# Patient Record
Sex: MALE | Race: WHITE | Employment: OTHER | URBAN - METROPOLITAN AREA
[De-identification: names, ages, dates, MRNs, and addresses within clinical notes are randomized per-mention and may not be internally consistent; named-entity substitution may affect disease eponyms.]

---

## 2017-04-13 ENCOUNTER — OFFICE VISIT (OUTPATIENT)
Dept: CARDIOLOGY CLINIC | Age: 67
End: 2017-04-13

## 2017-04-13 VITALS
OXYGEN SATURATION: 97 % | HEART RATE: 80 BPM | DIASTOLIC BLOOD PRESSURE: 64 MMHG | HEIGHT: 69 IN | WEIGHT: 241 LBS | BODY MASS INDEX: 35.7 KG/M2 | SYSTOLIC BLOOD PRESSURE: 130 MMHG

## 2017-04-13 DIAGNOSIS — I10 ESSENTIAL HYPERTENSION: Primary | ICD-10-CM

## 2017-04-13 DIAGNOSIS — R06.02 SOB (SHORTNESS OF BREATH): ICD-10-CM

## 2017-04-13 DIAGNOSIS — I45.9 CARDIAC CONDUCTION DISORDER: ICD-10-CM

## 2017-04-13 DIAGNOSIS — E78.5 HYPERLIPIDEMIA, UNSPECIFIED HYPERLIPIDEMIA TYPE: ICD-10-CM

## 2017-04-13 PROCEDURE — 99214 OFFICE O/P EST MOD 30 MIN: CPT | Performed by: INTERNAL MEDICINE

## 2017-09-18 ENCOUNTER — HOSPITAL ENCOUNTER (OUTPATIENT)
Dept: OTHER | Age: 67
Discharge: HOME | End: 2017-09-18
Attending: UROLOGY | Admitting: UROLOGY

## 2017-09-18 DIAGNOSIS — N20.0 CALCULUS OF KIDNEY: ICD-10-CM

## 2017-10-02 ENCOUNTER — HOSPITAL ENCOUNTER (OUTPATIENT)
Dept: OTHER | Age: 67
Discharge: HOME | End: 2017-10-02
Attending: UROLOGY | Admitting: UROLOGY

## 2017-10-02 DIAGNOSIS — N20.0 CALCULUS OF KIDNEY: ICD-10-CM

## 2018-07-27 LAB — PROSTATE SPECIFIC ANTIGEN: 3.21 NG/ML (ref 0–4)

## 2018-08-02 PROBLEM — Z95.0 PACEMAKER: Status: ACTIVE | Noted: 2018-08-02

## 2018-08-07 NOTE — PROGRESS NOTES
controlled  -Managed by PCP      Plan:  -Continue device interrogations as scheduled  -Discussed the importance of anticoagulation, but no afib on device thus will defer. He is aware of the risk. - On device to alert for recurrence. Would recommend starting NOAC for Afib recurrence.  -Educated on post procedure precautions    Follow up in 3 months    This note was scribed in the presence of Dr. Maurizio Diaz MD by ELIAZAR Vega.  Lauren RAZO

## 2018-08-08 ENCOUNTER — OFFICE VISIT (OUTPATIENT)
Dept: CARDIOLOGY CLINIC | Age: 68
End: 2018-08-08

## 2018-08-08 ENCOUNTER — PROCEDURE VISIT (OUTPATIENT)
Dept: CARDIOLOGY CLINIC | Age: 68
End: 2018-08-08

## 2018-08-08 VITALS
BODY MASS INDEX: 36.14 KG/M2 | OXYGEN SATURATION: 96 % | DIASTOLIC BLOOD PRESSURE: 84 MMHG | HEART RATE: 80 BPM | SYSTOLIC BLOOD PRESSURE: 138 MMHG | WEIGHT: 244 LBS | HEIGHT: 69 IN

## 2018-08-08 DIAGNOSIS — I44.2 AV BLOCK, 3RD DEGREE (HCC): Primary | ICD-10-CM

## 2018-08-08 DIAGNOSIS — Z95.0 PACEMAKER: Primary | ICD-10-CM

## 2018-08-08 DIAGNOSIS — I45.9 CARDIAC CONDUCTION DISORDER: ICD-10-CM

## 2018-08-08 DIAGNOSIS — I10 ESSENTIAL HYPERTENSION: ICD-10-CM

## 2018-08-08 DIAGNOSIS — E78.2 MIXED HYPERLIPIDEMIA: ICD-10-CM

## 2018-08-08 DIAGNOSIS — I48.0 PAROXYSMAL A-FIB (HCC): ICD-10-CM

## 2018-08-08 PROCEDURE — 99213 OFFICE O/P EST LOW 20 MIN: CPT | Performed by: INTERNAL MEDICINE

## 2018-08-08 PROCEDURE — 93280 PM DEVICE PROGR EVAL DUAL: CPT | Performed by: INTERNAL MEDICINE

## 2018-11-08 ENCOUNTER — OFFICE VISIT (OUTPATIENT)
Dept: CARDIOLOGY CLINIC | Age: 68
End: 2018-11-08

## 2018-11-08 ENCOUNTER — PROCEDURE VISIT (OUTPATIENT)
Dept: CARDIOLOGY CLINIC | Age: 68
End: 2018-11-08
Payer: MEDICARE

## 2018-11-08 VITALS
BODY MASS INDEX: 37.18 KG/M2 | DIASTOLIC BLOOD PRESSURE: 70 MMHG | OXYGEN SATURATION: 97 % | HEIGHT: 69 IN | SYSTOLIC BLOOD PRESSURE: 126 MMHG | HEART RATE: 74 BPM | WEIGHT: 251 LBS

## 2018-11-08 DIAGNOSIS — I44.30 AV BLOCK: ICD-10-CM

## 2018-11-08 DIAGNOSIS — I44.7 LBBB (LEFT BUNDLE BRANCH BLOCK): ICD-10-CM

## 2018-11-08 DIAGNOSIS — I48.0 PAROXYSMAL ATRIAL FIBRILLATION (HCC): ICD-10-CM

## 2018-11-08 DIAGNOSIS — R55 SYNCOPE, UNSPECIFIED SYNCOPE TYPE: Primary | ICD-10-CM

## 2018-11-08 DIAGNOSIS — Z95.0 PACEMAKER: ICD-10-CM

## 2018-11-08 DIAGNOSIS — I45.9 CARDIAC CONDUCTION DISORDER: ICD-10-CM

## 2018-11-08 DIAGNOSIS — I10 ESSENTIAL HYPERTENSION: ICD-10-CM

## 2018-11-08 PROCEDURE — 93280 PM DEVICE PROGR EVAL DUAL: CPT | Performed by: INTERNAL MEDICINE

## 2018-11-08 PROCEDURE — 99024 POSTOP FOLLOW-UP VISIT: CPT | Performed by: INTERNAL MEDICINE

## 2019-02-11 PROCEDURE — 93296 REM INTERROG EVL PM/IDS: CPT | Performed by: INTERNAL MEDICINE

## 2019-02-11 PROCEDURE — 93294 REM INTERROG EVL PM/LDLS PM: CPT | Performed by: INTERNAL MEDICINE

## 2019-02-12 ENCOUNTER — NURSE ONLY (OUTPATIENT)
Dept: CARDIOLOGY CLINIC | Age: 69
End: 2019-02-12
Payer: MEDICARE

## 2019-02-12 DIAGNOSIS — I45.9 CARDIAC CONDUCTION DISORDER: ICD-10-CM

## 2019-02-12 DIAGNOSIS — Z95.0 PACEMAKER: ICD-10-CM

## 2019-05-21 ENCOUNTER — NURSE ONLY (OUTPATIENT)
Dept: CARDIOLOGY CLINIC | Age: 69
End: 2019-05-21
Payer: MEDICARE

## 2019-05-21 DIAGNOSIS — I45.9 CARDIAC CONDUCTION DISORDER: ICD-10-CM

## 2019-05-21 DIAGNOSIS — Z95.0 PACEMAKER: ICD-10-CM

## 2019-05-21 PROCEDURE — 93296 REM INTERROG EVL PM/IDS: CPT | Performed by: INTERNAL MEDICINE

## 2019-05-21 PROCEDURE — 93294 REM INTERROG EVL PM/LDLS PM: CPT | Performed by: INTERNAL MEDICINE

## 2019-05-21 NOTE — PROGRESS NOTES
Remote/Carelink interrogation shows normal device function. No new episodes/arrhythmias recorded. Dr. Prasanna Jessica to review interrogation. See interrogation/Paceart report for further details.

## 2019-05-21 NOTE — LETTER
[address] [facility] [phone]    Pacemaker/Defibrillator Clinic          05/21/19        [address] [facility] [address] [facility] [address]        Dear Deloris Romero    This letter is to inform you that we received the transmission from your monitor at home that checks your pacemaker and/or defibrillator, or implanted heart monitor. The next date you should transmit will be 8/27/19. Please be aware that the remote device transmission sites are periodically monitored only during regular business hours during which simultaneous in-office device clinics are being run. If your transmission requires attention, we will contact you as soon as possible. Thank you.             [facility]

## 2019-08-27 ENCOUNTER — NURSE ONLY (OUTPATIENT)
Dept: CARDIOLOGY CLINIC | Age: 69
End: 2019-08-27
Payer: MEDICARE

## 2019-08-27 DIAGNOSIS — Z95.0 PACEMAKER: ICD-10-CM

## 2019-08-27 DIAGNOSIS — I44.30 INFRA-HIS ATRIOVENTRICULAR BLOCK: ICD-10-CM

## 2019-08-27 DIAGNOSIS — I45.9 CARDIAC CONDUCTION DISORDER: ICD-10-CM

## 2019-08-27 PROCEDURE — 93294 REM INTERROG EVL PM/LDLS PM: CPT | Performed by: INTERNAL MEDICINE

## 2019-08-27 PROCEDURE — 93296 REM INTERROG EVL PM/IDS: CPT | Performed by: INTERNAL MEDICINE

## 2019-08-30 PROBLEM — I44.30: Status: ACTIVE | Noted: 2019-08-30

## 2019-10-09 ENCOUNTER — PROCEDURE VISIT (OUTPATIENT)
Dept: CARDIOLOGY CLINIC | Age: 69
End: 2019-10-09

## 2019-10-09 DIAGNOSIS — Z95.0 PACEMAKER: ICD-10-CM

## 2019-10-09 DIAGNOSIS — I45.9 CARDIAC CONDUCTION DISORDER: ICD-10-CM

## 2019-10-09 DIAGNOSIS — I44.30 INFRA-HIS ATRIOVENTRICULAR BLOCK: ICD-10-CM

## 2019-11-26 ENCOUNTER — NURSE ONLY (OUTPATIENT)
Dept: CARDIOLOGY CLINIC | Age: 69
End: 2019-11-26
Payer: MEDICARE

## 2019-11-26 ENCOUNTER — OFFICE VISIT (OUTPATIENT)
Dept: CARDIOLOGY CLINIC | Age: 69
End: 2019-11-26
Payer: MEDICARE

## 2019-11-26 VITALS
SYSTOLIC BLOOD PRESSURE: 138 MMHG | HEART RATE: 75 BPM | BODY MASS INDEX: 34.07 KG/M2 | WEIGHT: 230 LBS | DIASTOLIC BLOOD PRESSURE: 70 MMHG | OXYGEN SATURATION: 96 % | HEIGHT: 69 IN

## 2019-11-26 DIAGNOSIS — I48.0 PAROXYSMAL ATRIAL FIBRILLATION (HCC): Primary | ICD-10-CM

## 2019-11-26 DIAGNOSIS — I44.30 INFRA-HIS ATRIOVENTRICULAR BLOCK: ICD-10-CM

## 2019-11-26 DIAGNOSIS — I44.1 AV BLOCK, 2ND DEGREE: ICD-10-CM

## 2019-11-26 DIAGNOSIS — Z95.0 PACEMAKER: ICD-10-CM

## 2019-11-26 DIAGNOSIS — I45.9 CARDIAC CONDUCTION DISORDER: ICD-10-CM

## 2019-11-26 PROCEDURE — 93000 ELECTROCARDIOGRAM COMPLETE: CPT | Performed by: INTERNAL MEDICINE

## 2019-11-26 PROCEDURE — 93280 PM DEVICE PROGR EVAL DUAL: CPT | Performed by: INTERNAL MEDICINE

## 2019-11-26 PROCEDURE — 99214 OFFICE O/P EST MOD 30 MIN: CPT | Performed by: INTERNAL MEDICINE

## 2020-02-14 ENCOUNTER — HOSPITAL ENCOUNTER (OUTPATIENT)
Dept: GENERAL RADIOLOGY | Age: 70
Discharge: HOME | End: 2020-02-14
Payer: MEDICARE

## 2020-02-14 ENCOUNTER — HOSPITAL ENCOUNTER (OUTPATIENT)
Age: 70
Discharge: HOME | End: 2020-02-14
Payer: MEDICARE

## 2020-02-14 PROCEDURE — 71046 X-RAY EXAM CHEST 2 VIEWS: CPT

## 2020-03-03 ENCOUNTER — NURSE ONLY (OUTPATIENT)
Dept: CARDIOLOGY CLINIC | Age: 70
End: 2020-03-03
Payer: MEDICARE

## 2020-03-03 NOTE — PROGRESS NOTES
Carelink transmission shows normal sensing and pacing function. Noted NSVT. Pt is on Toprol. Patient has known AF but wishes not to be on anti coags. . See interrogation for more details.

## 2020-03-03 NOTE — LETTER
[address] [facility] [phone]  [place] [facility] [phone]    Pacemaker/Defibrillator Clinic          03/03/20        [address] [facility] [address] [place] [address]        Dear Domi Gregory    This letter is to inform you that we received the transmission from your monitor at home that checks your implanted heart device. The next date your monitor will automatically transmit will be 6-3-20. If your report needs attention we will notify you. Your device and monitor are wireless and most transmit cellularly, but please periodically check your monitor is still plugged in to the electrical outlet. If you still use the telephone land line to send please ensure the connection to the phone harshil is secure. This will help to ensure successful automatic transmissions in the future. Also, the monitor needs to be close to you while sleeping at night. Please be aware that the remote device transmission sites are periodically monitored only during regular business hours during which simultaneous in-office device clinics are being run. If your transmission requires attention, we will contact you as soon as possible. Thank you.             [facility]

## 2020-03-04 PROCEDURE — 93296 REM INTERROG EVL PM/IDS: CPT | Performed by: INTERNAL MEDICINE

## 2020-03-04 PROCEDURE — 93294 REM INTERROG EVL PM/LDLS PM: CPT | Performed by: INTERNAL MEDICINE

## 2020-06-03 ENCOUNTER — NURSE ONLY (OUTPATIENT)
Dept: CARDIOLOGY CLINIC | Age: 70
End: 2020-06-03
Payer: MEDICARE

## 2020-06-03 PROCEDURE — 93294 REM INTERROG EVL PM/LDLS PM: CPT | Performed by: INTERNAL MEDICINE

## 2020-06-03 PROCEDURE — 93296 REM INTERROG EVL PM/IDS: CPT | Performed by: INTERNAL MEDICINE

## 2020-09-08 ENCOUNTER — NURSE ONLY (OUTPATIENT)
Dept: CARDIOLOGY CLINIC | Age: 70
End: 2020-09-08
Payer: MEDICARE

## 2020-09-08 PROCEDURE — 93294 REM INTERROG EVL PM/LDLS PM: CPT | Performed by: INTERNAL MEDICINE

## 2020-09-08 PROCEDURE — 93296 REM INTERROG EVL PM/IDS: CPT | Performed by: INTERNAL MEDICINE

## 2020-09-08 NOTE — LETTER
[address] [facility] [phone]  [address] [phone]    Pacemaker/Defibrillator Clinic          09/09/20        91 [facility] [address] [facility] [address]        Dear Fariha Almazan    This letter is to inform you that we received the transmission from your monitor at home that checks your implanted heart device. The next date your monitor will automatically transmit will be 3-8-21. If your report needs attention we will notify you. Your device and monitor are wireless and most transmit cellularly, but please periodically check your monitor is still plugged in to the electrical outlet. If you still use the telephone land line to send please ensure the connection to the phone harshli is secure. This will help to ensure successful automatic transmissions in the future. Also, the monitor needs to be close to you while sleeping at night. Please be aware that the remote device transmission sites are periodically monitored only during regular business hours during which simultaneous in-office device clinics are being run. If your transmission requires attention, we will contact you as soon as possible. Thank you.             [address]ancy [address]

## 2020-09-09 NOTE — PROGRESS NOTES
We received remote transmission from patient's monitor at home. Transmission shows normal sensing and pacing function. EP physician will review. See interrogation under cardiology tab in the [address] [facility] [address] field for more details.

## 2020-12-01 ENCOUNTER — NURSE ONLY (OUTPATIENT)
Dept: CARDIOLOGY CLINIC | Age: 70
End: 2020-12-01
Payer: MEDICARE

## 2020-12-01 ENCOUNTER — OFFICE VISIT (OUTPATIENT)
Dept: CARDIOLOGY CLINIC | Age: 70
End: 2020-12-01
Payer: MEDICARE

## 2020-12-01 VITALS
SYSTOLIC BLOOD PRESSURE: 144 MMHG | WEIGHT: 240 LBS | HEIGHT: 69 IN | BODY MASS INDEX: 35.55 KG/M2 | DIASTOLIC BLOOD PRESSURE: 78 MMHG | OXYGEN SATURATION: 96 % | TEMPERATURE: 97.5 F | HEART RATE: 78 BPM

## 2020-12-01 PROCEDURE — 99214 OFFICE O/P EST MOD 30 MIN: CPT | Performed by: INTERNAL MEDICINE

## 2020-12-01 PROCEDURE — 93280 PM DEVICE PROGR EVAL DUAL: CPT | Performed by: INTERNAL MEDICINE

## 2020-12-01 PROCEDURE — 93000 ELECTROCARDIOGRAM COMPLETE: CPT | Performed by: INTERNAL MEDICINE

## 2020-12-01 NOTE — PROGRESS NOTES
Pt seen in clinic today for cardiac device interrogation. Their device is a MDT 2 chamber ppm.  Based on threshold, impedance, and intrinsic sensing tests run today, the device appears to be functioning normally. Remaining battery life is 10y6m  AP 28.74%                  77.94%      Pt with one episode AT/AF since last remote check - 21-Sep-2020 12:34am [id number]    Rx: No AC   metoprolol succinate (TOPROL XL) 25 MG extended release tablet Take 1 tablet by mouth daily     Pt was informed of findings today and general questions have been answered with regard to device. Home monitoring hardware is transmitting on schedule. Results discussed with or to be reviewed by Dr. Luana Acosta    Pt to see Dr. Luana Acosta in clinic today following their device check.

## 2020-12-01 NOTE — PROGRESS NOTES
Negative for dizziness, syncope and light-headedness. Hematological: Negative. Psychiatric/Behavioral: Negative for behavioral problems and agitation. BP (!) 144/78 Comment: recheck  Pulse 78   Temp 97.5 °F (36.4 °C)   Ht 5' 9\" (1.753 m)   Wt 240 lb (108.9 kg) Comment: with shoes  SpO2 96%   BMI 35.44 kg/m²     Objective:  Physical Exam   Nursing note and vitals reviewed. Constitutional: He appears well-developed and well-nourished. Head:  atraumatic. Eyes: Right eye exhibits no discharge. Left eye exhibits no discharge. Neck: Neck supple. Cardiovascular: Normal rate, regular rhythm and normal heart sounds. Pulmonary/Chest: Effort normal and breath sounds normal.   Abdominal: Soft. Musculoskeletal: He exhibits no edema. Neurological: He is alert without any gross abnormalities. Skin: Skin is warm and dry. +LCW incision healed   Psychiatric: He has a normal mood and affect. 24 hour holter: 7/2015  NSR with av block    Echo: 7/31/18  Suboptimal image quality. Normal left ventricle size and systolic function with an estimated ejection  fraction of 55-60%. No regional wall motion abnormalities are seen. There is  mildly increased left ventricular wall thickness. Diastolic filling  parameters suggest grade I diastolic dysfunction. Normal right ventricular size and function. Trivial mitral and tricuspid regurgitation. Stress Test: 12/2012  Normal myocardial perfusion scan with no evidence of myocardial ischemia or infarct. Assessment:    AV block with syncope:  -Consistent with paroxymal AV block with LBBB. -Underwent EPS with subsequent PPM implant  -Device interrogated today and functioning appropriately  -AP 23.5%,  1.2%     Paroxymal atrial fibrillation  EKG today shows sinus and ventricular pacing  On Toprol XL     He has a [id number] Score 2 (HTN, AGE). ~ 2 % stroke risk per year.   I discussed oral anticoagulation to decrease the risk of thromboembolic events including stroke. Benefits and alternatives were discussed with patient. Risk of bleeding was discussed. Patient verbalized understanding he currently declined to start on [address] at this time and will call the office if he decides to start in the future. Device interrogation today and based on threshold, impedance, and intrinsic sensing tests run today, the device appears to be functioning normally. Remaining battery life is 10 year 6 months  AP 28.74%    77.94%  Device interrogation today revealed one episode AT/AF since last interrogation. Atrial fibrillation risk factors including age, HTN, obesity, inactivity and CHHAYA were discussed with patient. Risk factor modification recommended. All questions were answered. Discussed that sleep apnea could contribute. HTN:  Controlled. Managed by PCP    Suspected sleep apnea  Referral placed to sleep medicine for evaluation. Plan:  1) Continue remote monitoring of device. 2) We again dicussed the importance of anticoagulation and he declined to start at this time and will contact the office if he wishes to start in the future. Referral to sleep medicine for evaluation of suspected sleep apnea. 3) Follow up in one year. Elana Peralta. Lauren RAZO Cardiac Electrophysiology  [address] [facility] [address]  [address] [facility], [address] [place] [address]  [phone]        This note was scribed in the presence of Yehuda Baldwin MD by Robby Charles RN. The scribes documentation has been prepared under my direction and personally reviewed by me in its entirety. I confirm that the note above accurately reflects all work, treatment, procedures, and medical decision making performed by me.

## 2021-03-07 PROCEDURE — 93296 REM INTERROG EVL PM/IDS: CPT | Performed by: INTERNAL MEDICINE

## 2021-03-07 PROCEDURE — 93294 REM INTERROG EVL PM/LDLS PM: CPT | Performed by: INTERNAL MEDICINE

## 2021-03-08 ENCOUNTER — NURSE ONLY (OUTPATIENT)
Dept: CARDIOLOGY CLINIC | Age: 71
End: 2021-03-08
Payer: MEDICARE

## 2021-03-08 DIAGNOSIS — I48.0 PAROXYSMAL ATRIAL FIBRILLATION (HCC): ICD-10-CM

## 2021-03-08 DIAGNOSIS — Z95.0 PACEMAKER: ICD-10-CM

## 2021-03-08 DIAGNOSIS — I45.9 CARDIAC CONDUCTION DISORDER: ICD-10-CM

## 2021-03-08 NOTE — LETTER
17[address] [facility] [phone]  [address] [phone]    Pacemaker/Defibrillator Clinic          03/08/21        [address] [facility] [address] [facility] [address]        Dear Hoa Harris    This letter is to inform you that we received the transmission from your monitor at home that checks your implanted heart device. The next date your monitor will automatically transmit will be 6-14-21. If your report needs attention we will notify you. Your device and monitor are wireless and most transmit cellularly, but please periodically check your monitor is still plugged in to the electrical outlet. If you still use the telephone land line to send please ensure the connection to the phone harshil is secure. This will help to ensure successful automatic transmissions in the future. Also, the monitor needs to be close to you while sleeping at night. Please be aware that the remote device transmission sites are periodically monitored only during regular business hours during which simultaneous in-office device clinics are being run. If your transmission requires attention, we will contact you as soon as possible. Thank you.             [facility]

## 2021-03-08 NOTE — PROGRESS NOTES
We received remote transmission from patient's monitor at home. Transmission shows normal sensing and pacing function. Pt has known AF. Pt doesn't want to be anti coagulated. EP physician will review. See interrogation under cardiology tab in the [address] [place] [address] [address] field for more details.

## 2021-05-21 LAB — PROSTATE SPECIFIC ANTIGEN: 2.45 NG/ML (ref 0–4)

## 2021-06-14 ENCOUNTER — NURSE ONLY (OUTPATIENT)
Dept: CARDIOLOGY CLINIC | Age: 71
End: 2021-06-14
Payer: MEDICARE

## 2021-06-14 DIAGNOSIS — I44.30 INFRA-HIS ATRIOVENTRICULAR BLOCK: ICD-10-CM

## 2021-06-14 DIAGNOSIS — I45.9 CARDIAC CONDUCTION DISORDER: ICD-10-CM

## 2021-06-14 DIAGNOSIS — Z95.0 PACEMAKER: ICD-10-CM

## 2021-06-14 NOTE — LETTER
[address] [facility] [phone]  [address] [phone]    Pacemaker/Defibrillator Clinic    06/16/21      [address] [place] [address] [address] [place] [address]      Dear Sarahy Durham    This letter is to inform you that we received the transmission from your monitor at home that checks your implanted heart device. The next date your monitor will automatically transmit will be 9/14. If your report needs attention we will notify you. Your device and monitor are wireless and most transmit cellularly, but please periodically check your monitor is still plugged in to the electrical outlet. If you still use the telephone land line to send please ensure the connection to the phone harshil is secure. This will help to ensure successful automatic transmissions in the future. Also, the monitor needs to be close to you while sleeping at night. Please be aware that the remote device transmission sites are periodically monitored only during regular business hours during which simultaneous in-office device clinics are being run. If your transmission requires attention, we will contact you as soon as possible. **PLEASE NOTE** that our [facility] policy and processes are changing to ensure a more seamless approach for all parties involved, allowing more time for our nurses to address patient issues and concerns. We will no longer be sending letters for NORMAL remote transmissions. You will be contacted by phone if your transmission requires attention (as previously done), and letters will only be sent regarding monitor disconnections or missed transmissions if you are unable to be reached by phone. Please do not be alarmed by this new process, as we will continue to contact you if your transmission report requires attention. This will be your final \"remote received\" letter.   From this point forward, the [facility] will be

## 2021-06-15 NOTE — PROGRESS NOTES
We received remote transmission from patient's monitor at home. Transmission shows normal sensing and pacing function. Pt has known AF. Pt doesn't want to be anti coagulated. Longest AT/AF event ~10hrs, 1.3% burden. EP physician will review. See interrogation under cardiology tab in the [address] [place] [address] [address] field for more details. Follow up in 3 months via [employer].

## 2021-06-16 PROCEDURE — 93294 REM INTERROG EVL PM/LDLS PM: CPT | Performed by: INTERNAL MEDICINE

## 2021-06-16 PROCEDURE — 93296 REM INTERROG EVL PM/IDS: CPT | Performed by: INTERNAL MEDICINE

## 2021-06-16 NOTE — RESULT ENCOUNTER NOTE
Device interrogation reviewed. Many episodes of atrial fibrillation but not on [address]. Please have patient see me in clinic to discuss these findings, the need for DOAC and the treatment options for atrial fibrillation including ablation.

## 2021-07-27 NOTE — PROGRESS NOTES
[facility]   Cardiac Consultation    Referring Provider:  WILL Huff CNP     Chief Complaint   Patient presents with    Atrial Fibrillation     afib - device check - called in by staff       HPI:  Yusuf Aldana is a 66 y.o. male with a PMH significant for HTN, HLD and PAfib that presented to [facility] 7/21/18 with his fourth episode of of abrupt syncope. He has had LBBB back to 2013 with prior EKG's in 2000 and 3/2008 stress not mentioning LBBB. He described the four episodes as being in various positions including kneeling. This one was a sudden LOC after < 1 sec of dizziness and feeling hot. He has no hx of chest pain or sob. Underwent EPS revealing infra-his block with subsequent PPM implant on 8/2/18. He had a couple hours of afib 10/9/20. We reviewed paf time he was here and he declined [address] [employer]. She also never followed up on sleep apnea. Interval History: Today, he presents to office for follow up for atrial fibrillation as colleague arranged for him to be seen. Jairo Warren He reports that he did not complete his sleep study. He reports shortness of breath which he reports is stable since last visit and before. . Patient denies exertional chest pain/pressure, dyspnea at rest, PND, orthopnea, palpitations, lightheadedness, weight changes, changes in LE edema, and syncope. Past Medical History:   has a past medical history of Atrial fibrillation ([address] Colon polyp, Enlarged prostate, FHx: hyperlipidemia, History of echocardiogram, and Hypertension. Surgical History:   has a past surgical history that includes Cardiac catheterization; Colonoscopy; TURP (06/29/2016); and Prostate biopsy (2016). Social History:   reports that he has never smoked. He has never used smokeless tobacco. He reports current alcohol use. He reports that he does not use drugs.      Family History:  family history includes Cancer in his father and mother; Diabetes in his father; High Cholesterol in his father and mother. Home Medications:  Outpatient Encounter Medications as of 7/29/2021   Medication Sig Dispense Refill    metoprolol succinate (TOPROL XL) 25 MG extended release tablet Take 1 tablet by mouth daily 30 tablet 3    ondansetron (ZOFRAN ODT) 4 MG disintegrating tablet Take 1-2 tablets by mouth every 12 hours as needed for Nausea 12 tablet 0    lisinopril (PRINIVIL;ZESTRIL) 40 MG tablet TAKE ONE TABLET BY MOUTH DAILY 90 tablet 2    PROAIR  (90 BASE) MCG/ACT inhaler INHALE TWO PUFFS BY MOUTH FOUR TIMES A DAY AS NEEDED 1 Inhaler 10    albuterol sulfate HFA (PROAIR HFA) 108 (90 BASE) MCG/ACT inhaler Inhale 2 puffs into the lungs every 6 hours as needed for Wheezing 1 Inhaler 3    doxazosin (CARDURA) 8 MG tablet Take 1 tablet by mouth nightly 90 tablet 3    budesonide-formoterol (SYMBICORT) 160-4.5 MCG/ACT AERO Inhale 2 puffs into the lungs 2 times daily 1 Inhaler 3    fluticasone (FLONASE) 50 MCG/ACT nasal spray PLACE 2 SPRAYS IN EACH NOSTRIL DAILY 1 Bottle 10    aspirin 325 MG tablet Take 325 mg by mouth daily      cyanocobalamin 1000 MCG/ML injection Inject 1 mL into the muscle once for 1 dose 1 mL 0     Facility-Administered Encounter Medications as of 7/29/2021   Medication Dose Route Frequency Provider Last Rate Last Admin    cyanocobalamin injection 1,000 mcg  1,000 mcg Intramuscular Once Iona Johnston, APRN - CNP           Allergies:  Codeine and Hydrocodone     Review of Systems   Constitutional: Negative for activity change and appetite change. HENT: Negative for facial swelling and neck pain. Eyes: Negative for discharge and itching. Respiratory: chronic shortness of breath. Cardiovascular: Negative for palpitations. Negative for chest pain. Negative for leg swelling. Gastrointestinal: Negative for abdominal pain and abdominal distention. Genitourinary: Negative. Musculoskeletal: Negative. Skin: Negative for color change and pallor.     Neurological: Negative for dizziness, syncope and light-headedness. Hematological: Negative. Psychiatric/Behavioral: Negative for behavioral problems and agitation. BP (!) 148/80 (Site: Left Upper Arm, Position: Sitting)   Pulse 73   Ht 5' 9\" (1.753 m)   Wt 233 lb (105.7 kg)   SpO2 95%   BMI 34.41 kg/m²     Objective:  Physical Exam   Nursing note and vitals reviewed. Constitutional: He appears well-developed and well-nourished. Head:  atraumatic. Eyes: Right eye exhibits no discharge. Left eye exhibits no discharge. Neck: Neck supple. Cardiovascular: Normal rate, regular rhythm and normal heart sounds. Pulmonary/Chest: Effort normal and breath sounds normal.   Abdominal: Soft. Musculoskeletal: He exhibits no edema. Neurological: He is alert without any gross abnormalities. Skin: Skin is warm and dry. +LCW incision healed   Psychiatric: He has a normal mood and affect. 24 hour holter: 7/2015  NSR with av block    Echo: 7/31/18  Suboptimal image quality. Normal left ventricle size and systolic function with an estimated ejection  fraction of 55-60%. No regional wall motion abnormalities are seen. There is  mildly increased left ventricular wall thickness. Diastolic filling  parameters suggest grade I diastolic dysfunction. Normal right ventricular size and function. Trivial mitral and tricuspid regurgitation. Stress Test: 12/2012  Normal myocardial perfusion scan with no evidence of myocardial ischemia or infarct. Assessment:    AV block with syncope:  -Consistent with paroxymal AV block with LBBB. -Underwent EPS with subsequent PPM implant  -Device interrogated today and functioning appropriately  -AP 23.5%,  1.2%     Paroxymal atrial fibrillation  EKG today shows sinus and ventricular pacing  On Toprol XL     He has a [id number] Score 2 (HTN, AGE)  ~ 2 % stroke risk per year. Not currently on [address].   I discussed oral anticoagulation to decrease the risk of thromboembolic events including stroke. Benefits and alternatives were discussed with patient. Risk of bleeding was discussed. Patient verbalized understanding. Different forms of anticoagulants including warfarin (Coumadin), Dabigatran (Pradaxa), Rivaroxaban (Xarelto), Apixaban (Eliquis), and Edoxaban [facility]) were discussed. Patient again declines [address]. Atrial fibrillation risk factors including age, HTN, obesity, inactivity and CHHAYA were discussed with patient. Risk factor modification recommended. All questions were answered. Device interrogated today and based on threshold, impedance and intrinsic sensing test run today the device appear to be functioning with establish parameters. Remaining battery life is 9 year 5 months  AP 28.9%    37.6%    HTN:  Controlled. Managed by PCP    Suspected sleep apnea  Referral placed to sleep medicine for evaluation before. Patient did not complete sleep study. Encouraged to complete. Plan:  1) Continue remote monitoring of device. 2) We again dicussed the importance of anticoagulation and he declined at this time and will contact the office if he wishes to start in the future. 3) Follow up in one year. This note was scribed in the presence of Vilma Schilling MD by Cresencio Damico RN. The scribes documentation has been prepared under my direction and personally reviewed by me in its entirety. I confirm that the note above accurately reflects all work, treatment, procedures, and medical decision making performed by me. Nadine Williamson. Lauren FROST.    Cardiac Electrophysiology  [address], [address] [place] [address]  [phone]

## 2021-07-29 ENCOUNTER — OFFICE VISIT (OUTPATIENT)
Dept: CARDIOLOGY CLINIC | Age: 71
End: 2021-07-29
Payer: MEDICARE

## 2021-07-29 ENCOUNTER — NURSE ONLY (OUTPATIENT)
Dept: CARDIOLOGY CLINIC | Age: 71
End: 2021-07-29
Payer: MEDICARE

## 2021-07-29 VITALS
HEIGHT: 69 IN | HEART RATE: 73 BPM | WEIGHT: 233 LBS | OXYGEN SATURATION: 95 % | DIASTOLIC BLOOD PRESSURE: 80 MMHG | BODY MASS INDEX: 34.51 KG/M2 | SYSTOLIC BLOOD PRESSURE: 148 MMHG

## 2021-07-29 DIAGNOSIS — I48.0 PAROXYSMAL ATRIAL FIBRILLATION (HCC): ICD-10-CM

## 2021-07-29 DIAGNOSIS — I44.30 INFRA-HIS ATRIOVENTRICULAR BLOCK: ICD-10-CM

## 2021-07-29 DIAGNOSIS — Z95.0 PACEMAKER: ICD-10-CM

## 2021-07-29 DIAGNOSIS — I45.9 CARDIAC CONDUCTION DISORDER: ICD-10-CM

## 2021-07-29 DIAGNOSIS — I44.7 LBBB (LEFT BUNDLE BRANCH BLOCK): ICD-10-CM

## 2021-07-29 DIAGNOSIS — I42.9 CARDIOMYOPATHY, UNSPECIFIED TYPE (HCC): ICD-10-CM

## 2021-07-29 DIAGNOSIS — I48.0 PAROXYSMAL ATRIAL FIBRILLATION (HCC): Primary | ICD-10-CM

## 2021-07-29 PROCEDURE — 93280 PM DEVICE PROGR EVAL DUAL: CPT | Performed by: INTERNAL MEDICINE

## 2021-07-29 PROCEDURE — 99213 OFFICE O/P EST LOW 20 MIN: CPT | Performed by: INTERNAL MEDICINE

## 2021-09-14 ENCOUNTER — NURSE ONLY (OUTPATIENT)
Dept: CARDIOLOGY CLINIC | Age: 71
End: 2021-09-14

## 2021-09-14 DIAGNOSIS — I48.0 PAROXYSMAL ATRIAL FIBRILLATION (HCC): ICD-10-CM

## 2021-09-14 DIAGNOSIS — Z95.0 PACEMAKER: ICD-10-CM

## 2021-09-14 DIAGNOSIS — I44.30 INFRA-HIS ATRIOVENTRICULAR BLOCK: ICD-10-CM

## 2021-09-14 PROCEDURE — 93294 REM INTERROG EVL PM/LDLS PM: CPT | Performed by: INTERNAL MEDICINE

## 2021-09-14 PROCEDURE — 93296 REM INTERROG EVL PM/IDS: CPT | Performed by: INTERNAL MEDICINE

## 2021-09-15 NOTE — PROGRESS NOTES
We received remote transmission from patient's dual chamber pacemaker monitor at home. Transmission shows normal sensing and pacing function. Pt has known AF, 0.5% burden (ASA 325mg and Toprol XL); pt declines OAC. Ap 28.4%   100% (MVP On)  Echo 07.2018 showed EF of 55-60%. EP physician will review. See interrogation under cardiology tab in the [address] [place] [address] [address] field for more details.

## 2021-12-14 ENCOUNTER — NURSE ONLY (OUTPATIENT)
Dept: CARDIOLOGY CLINIC | Age: 71
End: 2021-12-14
Payer: MEDICARE

## 2021-12-14 DIAGNOSIS — Z95.0 PACEMAKER: ICD-10-CM

## 2021-12-14 DIAGNOSIS — I44.30 INFRA-HIS ATRIOVENTRICULAR BLOCK: ICD-10-CM

## 2021-12-15 NOTE — PROGRESS NOTES
We received remote transmission from patient's dual chamber pacemaker monitor at home. Transmission shows normal sensing and pacing function. Pt has known AF, 1.3% burden (ASA 325mg and Toprol XL); pt declines OAC. Ap 33.6%   100% (MVP On)  Echo 07.2018 showed EF of 55-60%. EP physician will review. See interrogation under cardiology tab in the [address] [place] [address] [address] field for more details. Will continue to monitor remotely.

## 2021-12-18 PROCEDURE — 93296 REM INTERROG EVL PM/IDS: CPT | Performed by: INTERNAL MEDICINE

## 2021-12-18 PROCEDURE — 93294 REM INTERROG EVL PM/LDLS PM: CPT | Performed by: INTERNAL MEDICINE

## 2022-03-15 ENCOUNTER — NURSE ONLY (OUTPATIENT)
Dept: CARDIOLOGY CLINIC | Age: 72
End: 2022-03-15
Payer: MEDICARE

## 2022-03-15 DIAGNOSIS — I44.7 LBBB (LEFT BUNDLE BRANCH BLOCK): ICD-10-CM

## 2022-03-15 DIAGNOSIS — I44.30 INFRA-HIS ATRIOVENTRICULAR BLOCK: ICD-10-CM

## 2022-03-15 DIAGNOSIS — I45.9 CARDIAC CONDUCTION DISORDER: ICD-10-CM

## 2022-03-15 DIAGNOSIS — Z95.0 PACEMAKER: ICD-10-CM

## 2022-03-15 NOTE — PROGRESS NOTES
We received remote transmission from patient's dual chamber pacemaker monitor at home. Transmission shows normal sensing and pacing function. Pt has known AF, 6.4% burden (ASA 325mg and Toprol XL; pt declines [address]). Ap 21.6%   98.7% (MVP On)  Echo 07.2018 showed EF of 55-60%. EP physician will review. See interrogation under cardiology tab in the [address] [facility] [address] field for more details. Will continue to monitor remotely.

## 2022-03-19 PROCEDURE — 93294 REM INTERROG EVL PM/LDLS PM: CPT | Performed by: INTERNAL MEDICINE

## 2022-03-19 PROCEDURE — 93296 REM INTERROG EVL PM/IDS: CPT | Performed by: INTERNAL MEDICINE

## 2022-06-20 ENCOUNTER — NURSE ONLY (OUTPATIENT)
Dept: CARDIOLOGY CLINIC | Age: 72
End: 2022-06-20
Payer: MEDICARE

## 2022-06-20 DIAGNOSIS — Z95.0 PACEMAKER: ICD-10-CM

## 2022-06-20 DIAGNOSIS — I44.30 INFRA-HIS ATRIOVENTRICULAR BLOCK: ICD-10-CM

## 2022-06-20 PROCEDURE — 93294 REM INTERROG EVL PM/LDLS PM: CPT | Performed by: INTERNAL MEDICINE

## 2022-06-20 PROCEDURE — 93296 REM INTERROG EVL PM/IDS: CPT | Performed by: INTERNAL MEDICINE

## 2022-06-23 NOTE — PROGRESS NOTES
We received remote transmission from patient's dual chamber pacemaker monitor at home. Transmission shows normal sensing and pacing function. Pt has known AF, 4.5% burden (ASA 325mg and Toprol XL; pt declines [facility]). Ap 29.3%   99.9% (MVP On)  Echo 07.2018 showed EF of 55-60%. EP physician will review. See interrogation under cardiology tab in the [address] [place] [address] [address] field for more details. Will continue to monitor remotely.      (End of 91-day monitoring period 6/20/22)

## 2022-07-22 ENCOUNTER — HOSPITAL ENCOUNTER (INPATIENT)
Age: 72
LOS: 2 days | Discharge: HOME | DRG: 378 | End: 2022-07-24
Attending: SPECIALIST | Admitting: SPECIALIST
Payer: MEDICARE

## 2022-07-22 ENCOUNTER — APPOINTMENT (OUTPATIENT)
Dept: GENERAL RADIOLOGY | Age: 72
DRG: 378 | End: 2022-07-22
Payer: MEDICARE

## 2022-07-22 DIAGNOSIS — D64.9 ANEMIA, UNSPECIFIED TYPE: ICD-10-CM

## 2022-07-22 DIAGNOSIS — K92.2 UPPER GASTROINTESTINAL BLEED: Primary | ICD-10-CM

## 2022-07-22 PROBLEM — T39.395A GI BLEED DUE TO NSAIDS: Status: ACTIVE | Noted: 2022-07-22

## 2022-07-22 LAB
ABO/RH: NORMAL
ANION GAP SERPL CALCULATED.3IONS-SCNC: 9 MMOL/L (ref 3–16)
ANTIBODY SCREEN: NORMAL
BASOPHILS ABSOLUTE: 0 K/UL (ref 0–0.2)
BASOPHILS RELATIVE PERCENT: 0.2 %
BILIRUBIN URINE: NEGATIVE
BLOOD, URINE: NEGATIVE
BUN BLDV-MCNC: 55 MG/DL (ref 7–20)
CALCIUM SERPL-MCNC: 9.5 MG/DL (ref 8.3–10.6)
CHLORIDE BLD-SCNC: 106 MMOL/L (ref 99–110)
CLARITY: CLEAR
CO2: 26 MMOL/L (ref 21–32)
COLOR: YELLOW
CREAT SERPL-MCNC: 1 MG/DL (ref 0.8–1.3)
EOSINOPHILS ABSOLUTE: 0 K/UL (ref 0–0.6)
EOSINOPHILS RELATIVE PERCENT: 0.5 %
GFR AFRICAN AMERICAN: >60
GFR NON-AFRICAN AMERICAN: >60
GLUCOSE BLD-MCNC: 105 MG/DL (ref 70–99)
GLUCOSE URINE: NEGATIVE MG/DL
HCT VFR BLD CALC: 30.1 % (ref 40.5–52.5)
HEMOGLOBIN: 10.3 G/DL (ref 13.5–17.5)
KETONES, URINE: ABNORMAL MG/DL
LEUKOCYTE ESTERASE, URINE: NEGATIVE
LYMPHOCYTES ABSOLUTE: 0.9 K/UL (ref 1–5.1)
LYMPHOCYTES RELATIVE PERCENT: 13.9 %
MCH RBC QN AUTO: 29.8 PG (ref 26–34)
MCHC RBC AUTO-ENTMCNC: 34.1 G/DL (ref 31–36)
MCV RBC AUTO: 87.5 FL (ref 80–100)
MICROSCOPIC EXAMINATION: ABNORMAL
MONOCYTES ABSOLUTE: 0.4 K/UL (ref 0–1.3)
MONOCYTES RELATIVE PERCENT: 6.2 %
NEUTROPHILS ABSOLUTE: 5.3 K/UL (ref 1.7–7.7)
NEUTROPHILS RELATIVE PERCENT: 79.2 %
NITRITE, URINE: NEGATIVE
OCCULT BLOOD DIAGNOSTIC: ABNORMAL
PDW BLD-RTO: 13.7 % (ref 12.4–15.4)
PH UA: 5 (ref 5–8)
PLATELET # BLD: 155 K/UL (ref 135–450)
PMV BLD AUTO: 8.9 FL (ref 5–10.5)
POTASSIUM REFLEX MAGNESIUM: 3.8 MMOL/L (ref 3.5–5.1)
PROTEIN UA: NEGATIVE MG/DL
RBC # BLD: 3.45 M/UL (ref 4.2–5.9)
SODIUM BLD-SCNC: 141 MMOL/L (ref 136–145)
SPECIFIC GRAVITY UA: 1.02 (ref 1–1.03)
URINE REFLEX TO CULTURE: ABNORMAL
URINE TYPE: ABNORMAL
UROBILINOGEN, URINE: 0.2 E.U./DL
WBC # BLD: 6.6 K/UL (ref 4–11)

## 2022-07-22 PROCEDURE — 1200000000 HC SEMI PRIVATE

## 2022-07-22 PROCEDURE — 6370000000 HC RX 637 (ALT 250 FOR IP): Performed by: NURSE PRACTITIONER

## 2022-07-22 PROCEDURE — 86850 RBC ANTIBODY SCREEN: CPT

## 2022-07-22 PROCEDURE — 85025 COMPLETE CBC W/AUTO DIFF WBC: CPT

## 2022-07-22 PROCEDURE — 71045 X-RAY EXAM CHEST 1 VIEW: CPT

## 2022-07-22 PROCEDURE — 86901 BLOOD TYPING SEROLOGIC RH(D): CPT

## 2022-07-22 PROCEDURE — 82270 OCCULT BLOOD FECES: CPT

## 2022-07-22 PROCEDURE — 2580000003 HC RX 258: Performed by: PHYSICIAN ASSISTANT

## 2022-07-22 PROCEDURE — 81003 URINALYSIS AUTO W/O SCOPE: CPT

## 2022-07-22 PROCEDURE — 80048 BASIC METABOLIC PNL TOTAL CA: CPT

## 2022-07-22 PROCEDURE — 2580000003 HC RX 258: Performed by: SPECIALIST

## 2022-07-22 PROCEDURE — 93005 ELECTROCARDIOGRAM TRACING: CPT | Performed by: PHYSICIAN ASSISTANT

## 2022-07-22 PROCEDURE — G0378 HOSPITAL OBSERVATION PER HR: HCPCS

## 2022-07-22 PROCEDURE — 86900 BLOOD TYPING SEROLOGIC ABO: CPT

## 2022-07-22 PROCEDURE — 99285 EMERGENCY DEPT VISIT HI MDM: CPT

## 2022-07-22 RX ORDER — DOXAZOSIN MESYLATE 4 MG/1
8 TABLET ORAL NIGHTLY
Status: DISCONTINUED | OUTPATIENT
Start: 2022-07-22 | End: 2022-07-22

## 2022-07-22 RX ORDER — ACETAMINOPHEN 325 MG/1
650 TABLET ORAL EVERY 4 HOURS PRN
Status: DISCONTINUED | OUTPATIENT
Start: 2022-07-22 | End: 2022-07-24 | Stop reason: HOSPADM

## 2022-07-22 RX ORDER — ONDANSETRON 4 MG/1
4 TABLET, ORALLY DISINTEGRATING ORAL EVERY 12 HOURS PRN
Status: DISCONTINUED | OUTPATIENT
Start: 2022-07-22 | End: 2022-07-24 | Stop reason: HOSPADM

## 2022-07-22 RX ORDER — 0.9 % SODIUM CHLORIDE 0.9 %
1000 INTRAVENOUS SOLUTION INTRAVENOUS ONCE
Status: COMPLETED | OUTPATIENT
Start: 2022-07-22 | End: 2022-07-22

## 2022-07-22 RX ORDER — SODIUM CHLORIDE 450 MG/100ML
INJECTION, SOLUTION INTRAVENOUS CONTINUOUS
Status: DISCONTINUED | OUTPATIENT
Start: 2022-07-22 | End: 2022-07-24 | Stop reason: HOSPADM

## 2022-07-22 RX ORDER — ALBUTEROL SULFATE 90 UG/1
2 AEROSOL, METERED RESPIRATORY (INHALATION) EVERY 6 HOURS PRN
Status: DISCONTINUED | OUTPATIENT
Start: 2022-07-22 | End: 2022-07-24 | Stop reason: HOSPADM

## 2022-07-22 RX ORDER — FLUTICASONE PROPIONATE 50 MCG
1 SPRAY, SUSPENSION (ML) NASAL DAILY
Status: DISCONTINUED | OUTPATIENT
Start: 2022-07-23 | End: 2022-07-24 | Stop reason: HOSPADM

## 2022-07-22 RX ORDER — METOPROLOL SUCCINATE 25 MG/1
25 TABLET, EXTENDED RELEASE ORAL NIGHTLY
Status: DISCONTINUED | OUTPATIENT
Start: 2022-07-22 | End: 2022-07-24 | Stop reason: HOSPADM

## 2022-07-22 RX ADMIN — SODIUM CHLORIDE 1000 ML: 9 INJECTION, SOLUTION INTRAVENOUS at 15:42

## 2022-07-22 RX ADMIN — ACETAMINOPHEN 650 MG: 325 TABLET ORAL at 23:06

## 2022-07-22 RX ADMIN — SODIUM CHLORIDE: 4.5 INJECTION, SOLUTION INTRAVENOUS at 20:04

## 2022-07-22 ASSESSMENT — PAIN - FUNCTIONAL ASSESSMENT
PAIN_FUNCTIONAL_ASSESSMENT: NONE - DENIES PAIN
PAIN_FUNCTIONAL_ASSESSMENT: NONE - DENIES PAIN

## 2022-07-22 ASSESSMENT — PAIN SCALES - GENERAL
PAINLEVEL_OUTOF10: 0
PAINLEVEL_OUTOF10: 0

## 2022-07-22 NOTE — PROGRESS NOTES
Medication Reconciliation     List of medications patient is currently taking is complete. Source of information: 1. Conversation with patient at bedside                                      2. EPIC records         Notes regarding home medications:   1.  Patient last received home meds 7/21; takes all medications at night    Briseida Navarrete, Pharmacy Intern  7/22/2022 5:06 PM

## 2022-07-22 NOTE — H&P
H & P dictated [phone]  Acute blood loss anemia,  Lower GI bleed, r/o NSAID gastropathy  Cardiac arrhythmia  S/p PM placement  HTN  Obesity,  Mild athma  Admitt,GI consult,dr Arndt, IVF, f/u H & H, resume some home med, NPO midnight,  Dr Manuel Massey

## 2022-07-22 NOTE — ED PROVIDER NOTES
[address] [facility]        Pt Name: Trice Katz  MRN: [id number]  [place] 1950  Date of evaluation: 7/22/2022  Provider: JOANNE Walker  PCP: WILL Graham - CNP  Note Started: 3:22 PM EDT     The ED Attending Physician was available for consultation but did not see or evaluate this patient. CHIEF COMPLAINT       Chief Complaint   Patient presents with    Rectal Bleeding     Pt with rectal bleeding for three days. Fatigue this am.  Denies abd pain, denies chest pain. Sob on exertion       HISTORY OF PRESENT ILLNESS   (Location, Timing/Onset, Context/Setting, Quality, Duration, Modifying Factors, Severity, Associated Signs and Symptoms)  Note limiting factors. Chief Complaint: Black stool, generalized weakness    Trice Katz is a 74 y.o. male who presents reporting that his stool has been black for the last couple of days. He says that also during this time he has felt generally tired and weak all over. Denies any loss of consciousness. Says he has not had this problem before. He denies diarrhea, and says he is not having any abdominal pain. Eating normally. Takes aspirin regularly but no other blood thinning medication. Says his last colonoscopy was maybe 5 or 6 years ago and he believes it was normal.  He denies any chest pain or shortness of breath. Says he had COVID infection earlier this year and has been still coughing a little since then, and he occasionally gets short of breath. He denies any chest pain. No urinary complaints. Nursing Notes were all reviewed and agreed with or any disagreements were addressed in the HPI. REVIEW OF SYSTEMS    (2-9 systems for level 4, 10 or more for level 5)     Review of Systems    Positives and pertinent negatives as per HPI.      PAST MEDICAL HISTORY     Past Medical History:   Diagnosis Date    Atrial fibrillation [facility])     Colon polyp 9/3/2008    Enlarged prostate     FHx: hyperlipidemia 4/9/1997    History of echocardiogram 2009    Hypertension        SURGICAL HISTORY     Past Surgical History:   Procedure Laterality Date    CARDIAC CATHETERIZATION      COLONOSCOPY      PROSTATE BIOPSY  2016    per patient     TRANSURETHRAL RESECTION OF PROSTATE  06/29/2016    with cysto       CURRENTMEDICATIONS       Previous Medications    ALBUTEROL SULFATE HFA (PROAIR HFA) 108 (90 BASE) MCG/ACT INHALER    Inhale 2 puffs into the lungs every 6 hours as needed for Wheezing    ASPIRIN 325 MG TABLET    Take 325 mg by mouth daily    BUDESONIDE-FORMOTEROL (SYMBICORT) 160-4.5 MCG/ACT AERO    Inhale 2 puffs into the lungs 2 times daily    CYANOCOBALAMIN 1000 MCG/ML INJECTION    Inject 1 mL into the muscle once for 1 dose    DOXAZOSIN (CARDURA) 8 MG TABLET    Take 1 tablet by mouth nightly    FLUTICASONE (FLONASE) 50 MCG/ACT NASAL SPRAY    PLACE 2 SPRAYS IN EACH NOSTRIL DAILY    LISINOPRIL (PRINIVIL;ZESTRIL) 40 MG TABLET    TAKE ONE TABLET BY MOUTH DAILY    METOPROLOL SUCCINATE (TOPROL XL) 25 MG EXTENDED RELEASE TABLET    Take 1 tablet by mouth daily    ONDANSETRON (ZOFRAN ODT) 4 MG DISINTEGRATING TABLET    Take 1-2 tablets by mouth every 12 hours as needed for Nausea    PROAIR  (90 BASE) MCG/ACT INHALER    INHALE TWO PUFFS BY MOUTH FOUR TIMES A DAY AS NEEDED       ALLERGIES     Codeine and Hydrocodone    FAMILYHISTORY       Family History   Problem Relation Age of Onset    Cancer Mother     High Cholesterol Mother     Cancer Father     Diabetes Father     High Cholesterol Father         SOCIAL HISTORY       Social History     Tobacco Use    Smoking status: Never    Smokeless tobacco: Never   Substance Use Topics    Alcohol use: Yes     Comment: seldom    Drug use: No       SCREENINGS    Harrah Coma Scale  Eye Opening: Spontaneous  Best Verbal Response: Oriented  Best Motor Response: Obeys commands  Harrah Coma Scale Score: 15      PHYSICAL EXAM    (up to 7 for level 4, 8 or more for level 5)     ED Triage Vitals   BP Temp Temp Source Heart Rate Resp SpO2 Height Weight   07/22/22 1458 07/22/22 1458 07/22/22 1458 07/22/22 1458 07/22/22 1458 07/22/22 1458 -- 07/22/22 1445   107/63 98.4 °F (36.9 °C) Oral 81 17 98 %  227 lb 15.3 oz (103.4 kg)       Physical Exam  Vitals and nursing note reviewed. Constitutional:       General: He is not in acute distress. Appearance: Normal appearance. He is not ill-appearing. HENT:      Head: Normocephalic and atraumatic. Nose: Nose normal.   Eyes:      General:         Right eye: No discharge. Left eye: No discharge. Cardiovascular:      Rate and Rhythm: Normal rate and regular rhythm. Heart sounds: Normal heart sounds. No murmur heard. No gallop. Pulmonary:      Effort: Pulmonary effort is normal. No respiratory distress. Breath sounds: Normal breath sounds. No stridor. No wheezing, rhonchi or rales. Abdominal:      General: Bowel sounds are normal. There is no distension. Palpations: Abdomen is soft. There is no mass. Tenderness: There is no abdominal tenderness. There is no guarding or rebound. Genitourinary:     Comments: Normal rectal exam, normal anal sphincter tone, no fissure or active bleeding or abnormal tenderness. Stool is black in color and thin in consistency, not watery. Musculoskeletal:         General: Normal range of motion. Cervical back: Normal range of motion. Skin:     General: Skin is warm and dry. Neurological:      General: No focal deficit present. Mental Status: He is alert and oriented to person, place, and time.    Psychiatric:         Mood and Affect: Mood normal.         Behavior: Behavior normal.       DIAGNOSTIC RESULTS   LABS:    Labs Reviewed   CBC WITH AUTO DIFFERENTIAL - Abnormal; Notable for the following components:       Result Value    RBC 3.45 (*)     Hemoglobin 10.3 (*)     Hematocrit 30.1 (*)     Lymphocytes Absolute 0.9 (*)     All other components medications:  Medications   0.45 % sodium chloride infusion (has no administration in time range)   fluticasone (FLONASE) 50 MCG/ACT nasal spray 1 spray (has no administration in time range)   metoprolol succinate (TOPROL XL) extended release tablet 25 mg ( Oral Automatically Held 7/25/22 2100)   ondansetron (ZOFRAN-ODT) disintegrating tablet 4 mg (has no administration in time range)   albuterol sulfate HFA (PROVENTIL;VENTOLIN;PROAIR) 108 (90 Base) MCG/ACT inhaler 2 puff (has no administration in time range)   0.9 % sodium chloride bolus (0 mLs IntraVENous Stopped 7/22/22 1615)           Is this patient to be included in the SEP-1 Core Measure due to severe sepsis or septic shock? No   Exclusion criteria - the patient is NOT to be included for SEP-1 Core Measure due to: Infection is not suspected    Patient had black, tarry stool on exam, but no hemorrhage. Essentially normal vital signs. Stool Hemoccult was positive. Hemoglobin was 10, with no recent comparisons in the last few years. BUN notably elevated at 55. Patient likely has an upper GI bleed, and he is in need of hospital admission for gastroenterology care and observation. I consulted Dr. Norris Allan, the physician on-call for the patient's primary care provider, who agreed to admit the patient. CRITICAL CARE TIME   CRITICAL CARE: There was a high probability of clinically significant and/or life threatening deterioration in this patient's condition which required my urgent intervention. I independently provided 5 minutes of non-concurrent critical care out of the total shared critical care time provided. This excludes any time for separately reportable procedures. FINAL IMPRESSION      1. Upper gastrointestinal bleed          DISPOSITION/PLAN   DISPOSITION Admitted 07/22/2022 05:44:10 PM      PATIENT REFERRED TO:  No follow-up provider specified.     DISCHARGE MEDICATIONS:  New Prescriptions    No medications on file       DISCONTINUED MEDICATIONS:  Discontinued Medications    No medications on file            (Please note that portions of this note were completed with a voice recognition program.  Efforts were made to edit the dictations but occasionally words are mis-transcribed.)    JOANNE Olmstead (electronically signed)        Lonnie Olmstead  07/22/22 1958

## 2022-07-22 NOTE — ED NOTES
SBAR to Constantin, all questions answered. Patient has 20G in RAC. Patient V paced on cardiac monitor, breathing regular, no distress, is alert & oriented to person, place and time. Sent to room 4250 via stretcher.      Raleigh Diamond RN  07/22/22 6295

## 2022-07-23 LAB
EKG ATRIAL RATE: 83 BPM
EKG DIAGNOSIS: NORMAL
EKG P AXIS: 68 DEGREES
EKG P-R INTERVAL: 176 MS
EKG Q-T INTERVAL: 444 MS
EKG QRS DURATION: 166 MS
EKG QTC CALCULATION (BAZETT): 521 MS
EKG R AXIS: -45 DEGREES
EKG T AXIS: 122 DEGREES
EKG VENTRICULAR RATE: 83 BPM
HCT VFR BLD CALC: 26.1 % (ref 40.5–52.5)
HCT VFR BLD CALC: 27.7 % (ref 40.5–52.5)
HEMOGLOBIN: 8.9 G/DL (ref 13.5–17.5)
HEMOGLOBIN: 9.5 G/DL (ref 13.5–17.5)
MCH RBC QN AUTO: 29.9 PG (ref 26–34)
MCH RBC QN AUTO: 30.2 PG (ref 26–34)
MCHC RBC AUTO-ENTMCNC: 34.1 G/DL (ref 31–36)
MCHC RBC AUTO-ENTMCNC: 34.5 G/DL (ref 31–36)
MCV RBC AUTO: 87.6 FL (ref 80–100)
MCV RBC AUTO: 87.8 FL (ref 80–100)
PDW BLD-RTO: 13.7 % (ref 12.4–15.4)
PDW BLD-RTO: 13.8 % (ref 12.4–15.4)
PLATELET # BLD: 130 K/UL (ref 135–450)
PLATELET # BLD: 135 K/UL (ref 135–450)
PMV BLD AUTO: 9 FL (ref 5–10.5)
PMV BLD AUTO: 9.1 FL (ref 5–10.5)
RBC # BLD: 2.98 M/UL (ref 4.2–5.9)
RBC # BLD: 3.16 M/UL (ref 4.2–5.9)
WBC # BLD: 3.6 K/UL (ref 4–11)
WBC # BLD: 4.3 K/UL (ref 4–11)

## 2022-07-23 PROCEDURE — G0378 HOSPITAL OBSERVATION PER HR: HCPCS

## 2022-07-23 PROCEDURE — 99232 SBSQ HOSP IP/OBS MODERATE 35: CPT | Performed by: INTERNAL MEDICINE

## 2022-07-23 PROCEDURE — 2580000003 HC RX 258: Performed by: INTERNAL MEDICINE

## 2022-07-23 PROCEDURE — 85027 COMPLETE CBC AUTOMATED: CPT

## 2022-07-23 PROCEDURE — 36415 COLL VENOUS BLD VENIPUNCTURE: CPT

## 2022-07-23 PROCEDURE — 1200000000 HC SEMI PRIVATE

## 2022-07-23 PROCEDURE — 96374 THER/PROPH/DIAG INJ IV PUSH: CPT

## 2022-07-23 PROCEDURE — 6360000002 HC RX W HCPCS: Performed by: INTERNAL MEDICINE

## 2022-07-23 PROCEDURE — C9113 INJ PANTOPRAZOLE SODIUM, VIA: HCPCS | Performed by: INTERNAL MEDICINE

## 2022-07-23 PROCEDURE — 6370000000 HC RX 637 (ALT 250 FOR IP): Performed by: SPECIALIST

## 2022-07-23 PROCEDURE — 6370000000 HC RX 637 (ALT 250 FOR IP): Performed by: NURSE PRACTITIONER

## 2022-07-23 PROCEDURE — 2580000003 HC RX 258: Performed by: SPECIALIST

## 2022-07-23 PROCEDURE — 94760 N-INVAS EAR/PLS OXIMETRY 1: CPT

## 2022-07-23 PROCEDURE — 93010 ELECTROCARDIOGRAM REPORT: CPT | Performed by: INTERNAL MEDICINE

## 2022-07-23 RX ORDER — LANOLIN ALCOHOL/MO/W.PET/CERES
6 CREAM (GRAM) TOPICAL NIGHTLY PRN
Status: DISCONTINUED | OUTPATIENT
Start: 2022-07-23 | End: 2022-07-24 | Stop reason: HOSPADM

## 2022-07-23 RX ADMIN — SODIUM CHLORIDE: 4.5 INJECTION, SOLUTION INTRAVENOUS at 22:37

## 2022-07-23 RX ADMIN — ACETAMINOPHEN 650 MG: 325 TABLET ORAL at 22:34

## 2022-07-23 RX ADMIN — Medication 6 MG: at 22:34

## 2022-07-23 RX ADMIN — SODIUM CHLORIDE, PRESERVATIVE FREE 40 MG: 5 INJECTION INTRAVENOUS at 20:12

## 2022-07-23 RX ADMIN — SODIUM CHLORIDE: 4.5 INJECTION, SOLUTION INTRAVENOUS at 16:03

## 2022-07-23 RX ADMIN — SODIUM CHLORIDE: 4.5 INJECTION, SOLUTION INTRAVENOUS at 06:48

## 2022-07-23 ASSESSMENT — PAIN DESCRIPTION - LOCATION: LOCATION: LEG

## 2022-07-23 ASSESSMENT — PAIN SCALES - GENERAL: PAINLEVEL_OUTOF10: 2

## 2022-07-23 ASSESSMENT — PAIN DESCRIPTION - DESCRIPTORS: DESCRIPTORS: SPASM

## 2022-07-23 NOTE — PROGRESS NOTES
[facility]    Consultation request received  Review of the Orders tab indicates that this was a consultation directed to Dr. Tiki Cruz.  Marylee Horner  Initial discussion with the patient revealed that this is  an established patient of Dr. Elke Dupont  He performed a colonoscopy 4-5 years ago at an outside [facility]  The patient's wife told me that she had called Dr. Elke Dupont office yesterday but never got a call back  Please notify the [facility] office with the consultation request  Thank you

## 2022-07-23 NOTE — H&P
[address] [facility]                              HISTORY AND PHYSICAL    PATIENT NAME: Gregor Stanton                    :        1950  MED REC NO:   [id number]                          ROOM:       050  ACCOUNT NO:   [de-identified]                           ADMIT DATE: 2022  PROVIDER:     Carol Henderson MD    ATTENDING PROVIDER:  Carol Henderson MD    CHIEF COMPLAINT:  Lower GI bleed. HISTORY OF PRESENT ILLNESS:  This 68-year-old male patient came to the  emergency room because of black tarry stool. The patient is taking  aspirin for his pacemaker placement. The patient had some nausea, no  vomiting, had no major abdominal pain. The patient's hemoglobin dropped  to 10.6. The patient was admitted for further evaluation. The patient  had been seen by Dr. Radha Ramon in the past.  The patient will be n.p.o.  for midnight in case tomorrow he is going to get an EGD. PAST MEDICAL HISTORY:  Significant for hypertension, cardiomyopathy,  cardiac arrhythmias, status post pacemaker placement, hyperlipidemia,  BPH, history of colon polyp, some asthma. ALLERGIES:  The patient is allergic to CODEINE and HYDROCODONE.    MEDICATIONS:  See the reconciliation sheet. Current Facility-Administered Medications   Medication Dose Route Frequency Provider Last Rate Last Admin    cyanocobalamin injection 1,000 mcg  1,000 mcg IntraMUSCular Once Iona Johnston, WILL - CNP         Current Outpatient Medications   Medication Sig Dispense Refill    pantoprazole (PROTONIX) 40 MG tablet Take 1 tablet by mouth in the morning and 1 tablet in the evening. Take before meals.  60 tablet 2    metoprolol succinate (TOPROL XL) 25 MG extended release tablet Take 1 tablet by mouth daily 30 tablet 3    ondansetron (ZOFRAN ODT) 4 MG disintegrating tablet Take 1-2 tablets by mouth every 12 hours as needed for Nausea 12 tablet 0    lisinopril (PRINIVIL;ZESTRIL) 40 MG tablet TAKE ONE TABLET BY MOUTH DAILY 90 tablet 2    PROAIR  (90 BASE) MCG/ACT inhaler INHALE TWO PUFFS BY MOUTH FOUR TIMES A DAY AS NEEDED 1 Inhaler 10    albuterol sulfate HFA (PROAIR HFA) 108 (90 BASE) MCG/ACT inhaler Inhale 2 puffs into the lungs every 6 hours as needed for Wheezing 1 Inhaler 3    doxazosin (CARDURA) 8 MG tablet Take 1 tablet by mouth nightly 90 tablet 3    budesonide-formoterol (SYMBICORT) 160-4.5 MCG/ACT AERO Inhale 2 puffs into the lungs 2 times daily 1 Inhaler 3    fluticasone (FLONASE) 50 MCG/ACT nasal spray PLACE 2 SPRAYS IN EACH NOSTRIL DAILY 1 Bottle 10        FAMILY HISTORY:  Noncontributory. SOCIAL HISTORY:  The patient is , has children. Nonsmoker. Occasionally drinks. REVIEW OF SYSTEMS:  No headache. No visual symptoms. Had some mild  allergies. No sore throat. No cough. No wheezing. No abdominal pain. Had some mild nausea. Had some dark-colored stool. No urinary  symptoms. The patient has no swelling. No arthritis. Pacemaker is in  place. Denies any chest pain or arrhythmia. PHYSICAL EXAMINATION:  VITAL SIGNS:  The patient's blood pressure is 107/63, respiratory rate  is 17, pulse 81, temperature 98.1. The patient weighs 227 pounds. Saturation is 98% on room air. GENERAL:  The patient is alert and oriented, well developed, well  nourished, moderately obese. SKIN:  Warm and dry. HEENT:  Head:  Normocephalic, atraumatic. Pupils are equal, both sides,  reactive to light and accommodation. Ears, Nose, and Throat: Within  normal limits. Mucous membranes are moist.  NECK:  Supple. No JVD. No lymphadenopathy. No thyromegaly. LUNGS:  Clear bilaterally. HEART:  S1, S2.  Regular rhythm. Pacemaker is in place. ABDOMEN:  Obese, soft. Bowel sounds present. No mass. No  hepatosplenomegaly. EXTREMITIES:  No edema, no cyanosis, no clubbing.     LABORATORY STUDIES:  WBC count was 6.6, hemoglobin 10.3, hematocrit  30.1, platelet count 840, MCV 87.5. Sodium was 141, potassium 3.8,  chloride 106, CO2 26, BUN 55, creatinine 1.0, glucose 106. CT scan of the abdomen showed 4-mm nephrolithiasis in the right  utero-pelvic junction, moderate hydronephrosis. Lungs clear. Liver,  spleen, pancreas, adrenal gland unremarkable. No bowel obstruction. Appendix normal.  _____ diverticula without any inflammation. Prostate  gland status post TURP. ASSESSMENT:  Lower GI bleed, _____ gastropathy, cardiac arrhythmia,  status post pacemaker placement, hypertension, diverticular disease,  some mild asthma, obesity. PLAN:  The patient will be admitted. Continue IV fluid. We are going  to hold some blood pressure medications. GI consult, Dr. Amanda Menjivar. Hold  Lovenox. The patient might have n.p.o. for midnight in case tomorrow he  has upper GI. I spent more than 30 minutes on face-to-face evaluation with the  patient. I discussed the management and findings with the patient and  nursing staff.         Jayashree Martinez MD    D: 07/22/2022 17:52:02       T: 07/22/2022 19:20:14     [email or web address]  Job#: [id number]     Doc#: [id number]    CC:

## 2022-07-23 NOTE — PROGRESS NOTES
Hospitalist Progress Note  7/23/2022 9:40 AM  Subjective:   Admit Date: 7/22/2022  PCP: WILL Palmer - CNP  Interval History:pt feels better  Denies any other complaints  Chart reviewed. Diet: Diet NPO  Medications:   Scheduled Meds:   fluticasone  1 spray Each Nostril Daily    [Held by provider] metoprolol succinate  25 mg Oral Nightly     Continuous Infusions:   sodium chloride 100 mL/hr at 07/23/22 0648     CBC:   Recent Labs     07/22/22  1536   WBC 6.6   HGB 10.3*        BMP:    Recent Labs     07/22/22  1536      K 3.8      CO2 26   BUN 55*   CREATININE 1.0   GLUCOSE 105*     Hepatic: No results for input(s): AST, ALT, ALB, BILITOT, ALKPHOS in the last 72 hours. Troponin: No results for input(s): TROPONINI in the last 72 hours. BNP: No results for input(s): BNP in the last 72 hours. Lipids: No results for input(s): CHOL, HDL in the last 72 hours. Invalid input(s): LDLCALCU  INR: No results for input(s): INR in the last 72 hours. Objective:   Vitals: /71   Pulse 78   Temp 98.3 °F (36.8 °C)   Resp 16   Wt 228 lb 9.9 oz (103.7 kg)   SpO2 97%   BMI 33.76 kg/m²   General appearance: alert,awake,oriented x 3 and cooperative with exam  HEENT: Head: Normal, normocephalic, atraumatic, anicteric, pupils are reactive to light. Dry mucous membrane.   Neck: no adenopathy, no carotid bruit, supple, symmetrical, trachea midline and thyroid not enlarged, symmetric, no tenderness/mass/nodules  Lungs: clear to auscultation bilaterally  Heart: regular rate and rhythm, S1, S2 normal, no murmur, click, rub or gallop  Abdomen: soft, non-tender; bowel sounds normal; no masses,  no organomegaly  Extremities: extremities normal, atraumatic, no cyanosis or edema  Neurologic: Mental status: Alert, oriented, thought content appropriate    Assessment and Plan:   GI bleed-monitor h/h      Patient Active Problem List:     Cardiomyopathy ([address]     HTN (hypertension)     Cardiac dysrhythmia SOB (shortness of breath)     Allergic rhinitis     FH: chronic lung disease requiring oxygen     FH: diabetes mellitus     FHx: hyperlipidemia     Hyperlipidemia     BPH (benign prostatic hyperplasia)     A-fib (HCC)     Gastric polyp     Syncope     PA (pernicious anemia)     Colon polyp     LBBB (left bundle branch block)     Autonomic dysfunction     Cerumen impaction     AB (asthmatic bronchitis)     Otitis     Hyperlipidemia     Cardiac conduction disorder     Pacemaker     Infra-HIS atrioventricular block     GI bleed due to NSAIDs    Pt is stable. Discussed with staff and pt about the plan. See the orders for further plan. Will proceed further acc to pt's progress.   Time spent with management of the pt is-20 minutes      Electronically signed by: Valentino Pimenta, MD, on 7/23/2022, at 9:40 AM

## 2022-07-23 NOTE — PROGRESS NOTES
Pt admitted to 4N, from ED. Vitals obtained on arrival and stable. Admission and Head-to-Toe assessment completed. Medications administer, per order. Pt is currently resting in bed, at the lowest level. Call light is within reach.   Torrance Gaucher, RN

## 2022-07-23 NOTE — CONSULTS
Gastroenterology Consult Note                          Patient: Yadira Dubois  : 1950  CSN#:      Date:  2022    Subjective:       History of Present Illness  Patient is a 74 y.o.  male admitted with Upper gastrointestinal bleed [K92.2]  GI bleed due to NSAIDs [K92.2, T39.395A] who is seen in consult for melan. Patient with melenic stool since Wednesday. Takes  per day. No other NSAIDs. A fib but no anticoagulants. No wt loss. Last colon 5 years ago. FH colon mother. Past Medical History:   Diagnosis Date    Atrial fibrillation ([facility]     Colon polyp 9/3/2008    Enlarged prostate     FHx: hyperlipidemia 1997    History of echocardiogram     Hypertension       Past Surgical History:   Procedure Laterality Date    CARDIAC CATHETERIZATION      COLONOSCOPY      PROSTATE BIOPSY      per patient     TRANSURETHRAL RESECTION OF PROSTATE  2016    with cysto      Past Endoscopic History: Colon about 5 years ago. Admission Meds  No current facility-administered medications on file prior to encounter.      Current Outpatient Medications on File Prior to Encounter   Medication Sig Dispense Refill    metoprolol succinate (TOPROL XL) 25 MG extended release tablet Take 1 tablet by mouth daily 30 tablet 3    ondansetron (ZOFRAN ODT) 4 MG disintegrating tablet Take 1-2 tablets by mouth every 12 hours as needed for Nausea 12 tablet 0    lisinopril (PRINIVIL;ZESTRIL) 40 MG tablet TAKE ONE TABLET BY MOUTH DAILY 90 tablet 2    PROAIR  (90 BASE) MCG/ACT inhaler INHALE TWO PUFFS BY MOUTH FOUR TIMES A DAY AS NEEDED 1 Inhaler 10    albuterol sulfate HFA (PROAIR HFA) 108 (90 BASE) MCG/ACT inhaler Inhale 2 puffs into the lungs every 6 hours as needed for Wheezing 1 Inhaler 3    doxazosin (CARDURA) 8 MG tablet Take 1 tablet by mouth nightly 90 tablet 3    budesonide-formoterol (SYMBICORT) 160-4.5 MCG/ACT AERO Inhale 2 puffs into the lungs 2 times daily 1 Inhaler 3    fluticasone (FLONASE) 50 MCG/ACT nasal spray PLACE 2 SPRAYS IN EACH NOSTRIL DAILY 1 Bottle 10    cyanocobalamin 1000 MCG/ML injection Inject 1 mL into the muscle once for 1 dose 1 mL 0    aspirin 325 MG tablet Take 325 mg by mouth daily         Patient denies NSAID use. Allergies  Allergies   Allergen Reactions    Codeine Nausea And Vomiting    Hydrocodone Nausea And Vomiting      Social   Social History     Tobacco Use    Smoking status: Never    Smokeless tobacco: Never   Substance Use Topics    Alcohol use: Yes     Comment: seldom        Family History   Problem Relation Age of Onset    Cancer Mother     High Cholesterol Mother     Cancer Father     Diabetes Father     High Cholesterol Father           Review of Systems  Pertinent items are noted in HPI. Physical Exam  Blood pressure 130/69, pulse 79, temperature 98.4 °F (36.9 °C), temperature source Oral, resp. rate 16, weight 228 lb 9.9 oz (103.7 kg), SpO2 97 %. General appearance: alert, cooperative, no distress, appears stated age  Eyes: Anicteric  Head: Normocephalic, without obvious abnormality  Lungs: clear to auscultation bilaterally, Normal Effort  Heart: regular rate and rhythm, normal S1 and S2, no murmurs or rubs  Abdomen: soft, non-tender. Bowel sounds normal. No masses,  no organomegaly. Extremities: atraumatic, no cyanosis or edema  Skin: warm and dry, no jaundice  Neuro: Grossly intact, A&OX3      Data Review:    Recent Labs     07/22/22  1536 07/23/22  1526   WBC 6.6 4.3   HGB 10.3* 9.5*   HCT 30.1* 27.7*   MCV 87.5 87.6    135     Recent Labs     07/22/22  1536      K 3.8      CO2 26   BUN 55*   CREATININE 1.0     No results for input(s): AST, ALT, ALB, BILIDIR, BILITOT, ALKPHOS in the last 72 hours. No results for input(s): LIPASE, AMYLASE in the last 72 hours. No results for input(s): PROTIME, INR in the last 72 hours. No results for input(s): PTT in the last 72 hours.   No results for input(s): OCCULTBLD in the last 72 hours. Assessment:     Principal Problem:    GI bleed due to NSAIDs  Resolved Problems:    * No resolved hospital problems. *    Patient with anemia from blood loss, melena. Slow GI bleed. BUN elevated. Recommendations:     Clears  EGD in am.  PPI. Follow Hgb.     Marcia Langford MD  [address] [facility]

## 2022-07-24 VITALS
BODY MASS INDEX: 33.89 KG/M2 | SYSTOLIC BLOOD PRESSURE: 157 MMHG | HEART RATE: 79 BPM | OXYGEN SATURATION: 96 % | DIASTOLIC BLOOD PRESSURE: 81 MMHG | WEIGHT: 229.5 LBS | TEMPERATURE: 98 F | RESPIRATION RATE: 16 BRPM

## 2022-07-24 LAB
HCT VFR BLD CALC: 26 % (ref 40.5–52.5)
HCT VFR BLD CALC: 28.3 % (ref 40.5–52.5)
HEMOGLOBIN: 8.9 G/DL (ref 13.5–17.5)
HEMOGLOBIN: 9.6 G/DL (ref 13.5–17.5)
MCH RBC QN AUTO: 30 PG (ref 26–34)
MCH RBC QN AUTO: 30.1 PG (ref 26–34)
MCHC RBC AUTO-ENTMCNC: 33.9 G/DL (ref 31–36)
MCHC RBC AUTO-ENTMCNC: 34.4 G/DL (ref 31–36)
MCV RBC AUTO: 87.6 FL (ref 80–100)
MCV RBC AUTO: 88.3 FL (ref 80–100)
PDW BLD-RTO: 13.3 % (ref 12.4–15.4)
PDW BLD-RTO: 13.6 % (ref 12.4–15.4)
PLATELET # BLD: 129 K/UL (ref 135–450)
PLATELET # BLD: 138 K/UL (ref 135–450)
PMV BLD AUTO: 8.9 FL (ref 5–10.5)
PMV BLD AUTO: 9 FL (ref 5–10.5)
RBC # BLD: 2.97 M/UL (ref 4.2–5.9)
RBC # BLD: 3.2 M/UL (ref 4.2–5.9)
WBC # BLD: 3.3 K/UL (ref 4–11)
WBC # BLD: 3.6 K/UL (ref 4–11)

## 2022-07-24 PROCEDURE — 36415 COLL VENOUS BLD VENIPUNCTURE: CPT

## 2022-07-24 PROCEDURE — 6360000002 HC RX W HCPCS: Performed by: INTERNAL MEDICINE

## 2022-07-24 PROCEDURE — G0378 HOSPITAL OBSERVATION PER HR: HCPCS

## 2022-07-24 PROCEDURE — 88305 TISSUE EXAM BY PATHOLOGIST: CPT

## 2022-07-24 PROCEDURE — C9113 INJ PANTOPRAZOLE SODIUM, VIA: HCPCS | Performed by: INTERNAL MEDICINE

## 2022-07-24 PROCEDURE — 99238 HOSP IP/OBS DSCHRG MGMT 30/<: CPT | Performed by: INTERNAL MEDICINE

## 2022-07-24 PROCEDURE — 0DB98ZX EXCISION OF DUODENUM, VIA NATURAL OR ARTIFICIAL OPENING ENDOSCOPIC, DIAGNOSTIC: ICD-10-PCS | Performed by: INTERNAL MEDICINE

## 2022-07-24 PROCEDURE — 3609012400 HC EGD TRANSORAL BIOPSY SINGLE/MULTIPLE: Performed by: INTERNAL MEDICINE

## 2022-07-24 PROCEDURE — 99152 MOD SED SAME PHYS/QHP 5/>YRS: CPT | Performed by: INTERNAL MEDICINE

## 2022-07-24 PROCEDURE — 2580000003 HC RX 258: Performed by: INTERNAL MEDICINE

## 2022-07-24 PROCEDURE — 85027 COMPLETE CBC AUTOMATED: CPT

## 2022-07-24 PROCEDURE — 2709999900 HC NON-CHARGEABLE SUPPLY: Performed by: INTERNAL MEDICINE

## 2022-07-24 PROCEDURE — 0DB68ZX EXCISION OF STOMACH, VIA NATURAL OR ARTIFICIAL OPENING ENDOSCOPIC, DIAGNOSTIC: ICD-10-PCS | Performed by: INTERNAL MEDICINE

## 2022-07-24 RX ORDER — PANTOPRAZOLE SODIUM 40 MG/1
40 TABLET, DELAYED RELEASE ORAL
Status: CANCELLED | OUTPATIENT
Start: 2022-07-24

## 2022-07-24 RX ORDER — PANTOPRAZOLE SODIUM 40 MG/1
40 TABLET, DELAYED RELEASE ORAL
Qty: 60 TABLET | Refills: 2 | Status: SHIPPED | OUTPATIENT
Start: 2022-07-24

## 2022-07-24 RX ORDER — MIDAZOLAM HYDROCHLORIDE 1 MG/ML
INJECTION INTRAMUSCULAR; INTRAVENOUS PRN
Status: DISCONTINUED | OUTPATIENT
Start: 2022-07-24 | End: 2022-07-24 | Stop reason: ALTCHOICE

## 2022-07-24 RX ORDER — ASPIRIN 81 MG/1
324 TABLET, CHEWABLE ORAL DAILY
Status: CANCELLED | OUTPATIENT
Start: 2022-07-24

## 2022-07-24 RX ORDER — FENTANYL CITRATE 50 UG/ML
INJECTION, SOLUTION INTRAMUSCULAR; INTRAVENOUS PRN
Status: DISCONTINUED | OUTPATIENT
Start: 2022-07-24 | End: 2022-07-24 | Stop reason: ALTCHOICE

## 2022-07-24 RX ADMIN — SODIUM CHLORIDE, PRESERVATIVE FREE 40 MG: 5 INJECTION INTRAVENOUS at 09:38

## 2022-07-24 ASSESSMENT — PAIN SCALES - GENERAL
PAINLEVEL_OUTOF10: 0
PAINLEVEL_OUTOF10: 0

## 2022-07-24 NOTE — PROGRESS NOTES
Pt with discharge orders. AVS reviewed with patient, all questions and concerns answered and acknowledged. AVS signed and copy sent with pt. PIV removed w/o complication. Taken down to car by PCA and transported home by family.

## 2022-07-24 NOTE — CARE COORDINATION
INITIAL CASE MANAGEMENT ASSESSMENT    Reviewed chart, met with patient to assess possible discharge needs. Explained Case Management role/services. Living Situation: Patient lives     ADLs: ***     DME: ***    PT/OT Recs: ***     Active Services: ***     Transportation: ***     Medications: ***    PCP:  WILL Vines CNP     HD/PD: ***    PLAN/COMMENTS: ***      The Plan for Transition of Care is related to the following treatment goals: ***    The Patient and/or patient representative *** was provided with a choice of provider and agrees   with the discharge plan. [] Yes [] No    Freedom of choice list was provided with basic dialogue that supports the patient's individualized plan of care/goals, treatment preferences and shares the quality data associated with the providers. [] Yes [] No    SW/CM provided contact information for patient or family to call with any questions. SW/CM will follow and assist as needed.

## 2022-07-24 NOTE — OP NOTE
Endoscopy Note    Patient: Austin Johnson  : 1950  Acct#:     Procedure: Esophagogastroduodenoscopy with biopsy                         Date:  2022     Surgeon:   Amanda Rivera MD    Referring Physician:  WILL Osuna - CNP    Indications: This is a 74y.o. year old male who presents today with Melena and anemia . Postoperative Diagnosis:    - 2 small clean based gastric ulcers measuring 6-8mm in size, located in the gastric antrum, just proximal to the pyloric channel with no high risk stigmata. - Mild gastric erythema in the gastric body, greater curvature   - Non obstructive Schatzki ring at the GE junction   - 1 cm hiatal hernia   - Mild duodenitis in the duodenal bulb. Anesthesia:  Versed 4 mg IV, fentanyl 75 mcg IV    Consent:  The patient or their legal guardian has signed an informed consent, and is aware of the potential risks, benefits, alternatives, and potential complications of this procedure. These include, but are not limited to hemorrhage, bleeding, post procedural pain, perforation, phlebitis, aspiration, hypotension, hypoxia, cardiovascular events such as arryhthmia, and possibly death. Description of Procedure: The patient was then taken to the endoscopy suite, placed in the left lateral decubitus position and the above IV sedation was administrered. The Olympus video endoscope was placed through the patient's oropharynx without difficulty to the extent of the 2nd portion of the duodenum. Both forward and retroflexed views of the stomach were obtained. Findings:    Esophagus: There was a non obstructive Schatzki ring at the GE junction   1 cm hiatal hernia   The Z line was located at 44 cm, the GE junction at 44 cm, and the hiatus at 45 cm. Stomach: The stomach appeared notable for 2 small clean based gastric ulcers measuring 6-8mm in size, located in the gastric antrum, just proximal to the pyloric channel with no high risk stigmata. These did not require endoscopic therapy. There was mild gastric erythema in the gastric body, greater curvature     Duodenum: There was mild duodenitis in the duodenal bulb, with normal appearing villous pattern in the first and second portions of the duodenum. The scope was then withdrawn back into the stomach, it was decompressed, and the scope was completely withdrawn. The patient tolerated the procedure well and was taken to the post anesthesia care unit in good condition. Estimated blood loss: None    * No specimens in log *    Impression:   1) See post procedure diagnoses    Recommendations:   - ok to resume regular diet   - Transition to PO Pantoprazole 40 mg BID for 8 weeks   - OK to resume aspirin, and if he does indeed need , would take this as 4 chewable baby aspirin rather than 1 pill. - Avoid NSAIDs   - Await pathology results   - OK for discharge home from a GI standpoint.   - He will need an EGD in about 2 months, which can be coordinated with his colonoscopy at that time.      Tyson Madison MD  [address] [facility]

## 2022-07-24 NOTE — H&P
Pre-operative History and Physical    Patient: Zahraa Holguin  : 1950  Acct#:     Intended Procedure:  EGD     HISTORY OF PRESENT ILLNESS:  The patient is a 74 y.o. male  who presents for/due to melena and anemia       Past Medical History:        Diagnosis Date    Atrial fibrillation ([facility] [address]     Colon polyp 9/3/2008    Enlarged prostate     FHx: hyperlipidemia 1997    History of echocardiogram 2009    Hypertension      Past Surgical History:        Procedure Laterality Date    CARDIAC CATHETERIZATION      COLONOSCOPY      PROSTATE BIOPSY  2016    per patient     TRANSURETHRAL RESECTION OF PROSTATE  2016    with cysto     Medications Prior to Admission:   No current facility-administered medications on file prior to encounter.      Current Outpatient Medications on File Prior to Encounter   Medication Sig Dispense Refill    metoprolol succinate (TOPROL XL) 25 MG extended release tablet Take 1 tablet by mouth daily 30 tablet 3    ondansetron (ZOFRAN ODT) 4 MG disintegrating tablet Take 1-2 tablets by mouth every 12 hours as needed for Nausea 12 tablet 0    lisinopril (PRINIVIL;ZESTRIL) 40 MG tablet TAKE ONE TABLET BY MOUTH DAILY 90 tablet 2    PROAIR  (90 BASE) MCG/ACT inhaler INHALE TWO PUFFS BY MOUTH FOUR TIMES A DAY AS NEEDED 1 Inhaler 10    albuterol sulfate HFA (PROAIR HFA) 108 (90 BASE) MCG/ACT inhaler Inhale 2 puffs into the lungs every 6 hours as needed for Wheezing 1 Inhaler 3    doxazosin (CARDURA) 8 MG tablet Take 1 tablet by mouth nightly 90 tablet 3    budesonide-formoterol (SYMBICORT) 160-4.5 MCG/ACT AERO Inhale 2 puffs into the lungs 2 times daily 1 Inhaler 3    fluticasone (FLONASE) 50 MCG/ACT nasal spray PLACE 2 SPRAYS IN EACH NOSTRIL DAILY 1 Bottle 10    cyanocobalamin 1000 MCG/ML injection Inject 1 mL into the muscle once for 1 dose 1 mL 0    aspirin 325 MG tablet Take 325 mg by mouth daily          Allergies:  Codeine and Hydrocodone    Social History:   TOBACCO: reports that he has never smoked. He has never used smokeless tobacco.  ETOH:   reports current alcohol use. DRUGS:   reports no history of drug use. PHYSICAL EXAM:      Vital Signs: BP (!) 152/78   Pulse 75   Temp 98 °F (36.7 °C) (Oral)   Resp 18   Wt 229 lb 8 oz (104.1 kg)   SpO2 95%   BMI 33.89 kg/m²    Airway: No stridor or wheezing noted. Good air movement  Pulmonary: without wheezes. Clear to auscultation  Cardiac:regular rate and rhythm without loud murmurs  Abdomen:soft, nontender,  Bowel sounds present    Pre-Procedure Assessment / Plan:  1) Melena  2) Anemia    ASA Grade:  ASA 2 - Patient with mild systemic disease with no functional limitations  Mallampati Classification:  Class I    Level of Sedation Plan: Moderate sedation    Post Procedure plan: Return to same level of care    I assessed the patient and find that the patient is in satisfactory condition to proceed with the planned procedure and sedation plan. I have explained the risk, benefits, and alternatives to the procedure; the patient understands and agrees to proceed. The patient was counseled at length about the risks of jeet Covid-19 during their perioperative period and any recovery window from their procedure. The patient was made aware that jeet Covid-19  may worsen their prognosis for recovering from their procedure  and lend to a higher morbidity and/or mortality risk. All material risks, benefits, and reasonable alternatives including postponing the procedure were discussed. The patient does wish to proceed with the procedure at this time.       Dony Charles MD  7/24/2022

## 2022-07-25 ENCOUNTER — CARE COORDINATION (OUTPATIENT)
Dept: CASE MANAGEMENT | Age: 72
End: 2022-07-25

## 2022-07-25 NOTE — DISCHARGE SUMMARY
Hospitalist Discharge Summary   7/24/2022 9:38 PM  Subjective:   Admit Date: 7/22/2022  PCP: Alice Pride, APRN - CNP  Interval History: pt is ready for the discharge  Feels better  Rest of the events as in progress notes and chart  Admitted for the gi bleed  Had egd  Diagnosed with gastric ulcers  Denies any other complaints  Chart reviewed. Diet: No diet orders on file  Medications:   Scheduled Meds:   cyanocobalamin  1,000 mcg IntraMUSCular Once     Continuous Infusions:  CBC:   Recent Labs     07/23/22  2140 07/24/22  0419 07/24/22  1028   WBC 3.6* 3.3* 3.6*   HGB 8.9* 8.9* 9.6*   * 129* 138     BMP:    Recent Labs     07/22/22  1536      K 3.8      CO2 26   BUN 55*   CREATININE 1.0   GLUCOSE 105*     Hepatic: No results for input(s): AST, ALT, ALB, BILITOT, ALKPHOS in the last 72 hours. Troponin: No results for input(s): TROPONINI in the last 72 hours. BNP: No results for input(s): BNP in the last 72 hours. Lipids: No results for input(s): CHOL, HDL in the last 72 hours. Invalid input(s): LDLCALCU  INR: No results for input(s): INR in the last 72 hours. Orders Placed This Encounter   Procedures    XR CHEST PORTABLE     Standing Status:   Standing     Number of Occurrences:   1     Order Specific Question:   Reason for exam:     Answer:   generalized weakness    CBC with Auto Differential     Standing Status:   Standing     Number of Occurrences:   1    Basic Metabolic Panel w/ Reflex to MG     Standing Status:   Standing     Number of Occurrences:   1    Blood Occult Stool Diagnostic     Standing Status:   Standing     Number of Occurrences:   1    Urinalysis with Reflex to Culture     Standing Status:   Standing     Number of Occurrences:   1    Surgical Pathology     A.  Gastric bx    Pre-op diagnosis:  Melena     Standing Status:   Standing     Number of Occurrences:   1    Orthostatic blood pressure and pulse     Standing Status:   Standing     Number of Occurrences:   1 Medication Sig Dispense Refill    pantoprazole (PROTONIX) 40 MG tablet Take 1 tablet by mouth in the morning and 1 tablet in the evening. Take before meals.  60 tablet 2    metoprolol succinate (TOPROL XL) 25 MG extended release tablet Take 1 tablet by mouth daily 30 tablet 3    ondansetron (ZOFRAN ODT) 4 MG disintegrating tablet Take 1-2 tablets by mouth every 12 hours as needed for Nausea 12 tablet 0    lisinopril (PRINIVIL;ZESTRIL) 40 MG tablet TAKE ONE TABLET BY MOUTH DAILY 90 tablet 2    PROAIR  (90 BASE) MCG/ACT inhaler INHALE TWO PUFFS BY MOUTH FOUR TIMES A DAY AS NEEDED 1 Inhaler 10    albuterol sulfate HFA (PROAIR HFA) 108 (90 BASE) MCG/ACT inhaler Inhale 2 puffs into the lungs every 6 hours as needed for Wheezing 1 Inhaler 3    doxazosin (CARDURA) 8 MG tablet Take 1 tablet by mouth nightly 90 tablet 3    budesonide-formoterol (SYMBICORT) 160-4.5 MCG/ACT AERO Inhale 2 puffs into the lungs 2 times daily 1 Inhaler 3    fluticasone (FLONASE) 50 MCG/ACT nasal spray PLACE 2 SPRAYS IN EACH NOSTRIL DAILY 1 Bottle 10       Orders Placed This Encounter   Medications    0.9 % sodium chloride bolus    DISCONTD: 0.45 % sodium chloride infusion    DISCONTD: doxazosin (CARDURA) tablet 8 mg    DISCONTD: fluticasone (FLONASE) 50 MCG/ACT nasal spray 1 spray    DISCONTD: metoprolol succinate (TOPROL XL) extended release tablet 25 mg    DISCONTD: ondansetron (ZOFRAN-ODT) disintegrating tablet 4 mg    DISCONTD: albuterol sulfate HFA (PROVENTIL;VENTOLIN;PROAIR) 108 (90 Base) MCG/ACT inhaler 2 puff     Order Specific Question:   Initiate RT Bronchodilator Protocol     Answer:   Yes - Inpatient Protocol    DISCONTD: acetaminophen (TYLENOL) tablet 650 mg    DISCONTD: pantoprazole (PROTONIX) 40 mg in sodium chloride (PF) 10 mL injection    DISCONTD: melatonin tablet 6 mg    DISCONTD: fentaNYL (SUBLIMAZE) injection    DISCONTD: midazolam (VERSED) injection    pantoprazole (PROTONIX) 40 MG tablet     Sig: Take 1 tablet by mouth in the morning and 1 tablet in the evening. Take before meals. Dispense:  60 tablet     Refill:  2           Objective:   Vitals: BP (!) 157/81   Pulse 79   Temp 98 °F (36.7 °C) (Oral)   Resp 16   Wt 229 lb 8 oz (104.1 kg)   SpO2 96%   BMI 33.89 kg/m²   General appearance: alert,awake,oriented x 3 and cooperative with exam  HEENT: Head: Normal, normocephalic, atraumatic, anicteric, pupils are reactive to light. Dry mucous membrane. Neck: no adenopathy, no carotid bruit, supple, symmetrical, trachea midline and thyroid not enlarged, symmetric, no tenderness/mass/nodules  Lungs: clear  Heart: regular rate and rhythm, S1, S2 normal, no murmur, click, rub or gallop  Abdomen: soft, non-tender; bowel sounds normal; no masses,  no organomegaly  Extremities: extremities normal, Neurologic: Mental status: Alert, oriented, thought content appropriate    Assessment and Plan:   Gi bleed- h/h monitored  Patient Active Problem List:     Cardiomyopathy ([address]     HTN (hypertension)     Cardiac dysrhythmia     SOB (shortness of breath)     Allergic rhinitis     FH: chronic lung disease requiring oxygen     FH: diabetes mellitus     FHx: hyperlipidemia     Hyperlipidemia     BPH (benign prostatic hyperplasia)     A-fib (HCC)     Gastric polyp     Syncope     PA (pernicious anemia)     Colon polyp     LBBB (left bundle branch block)     Autonomic dysfunction     Cerumen impaction     AB (asthmatic bronchitis)     Otitis     Hyperlipidemia     Cardiac conduction disorder     Pacemaker     Infra-HIS atrioventricular block     Upper gastrointestinal bleed    Pt is stable. Discussed with staff and pt about the plan. See the orders for further plan. Will proceed further acc to pt's progress. Time spent with management of the pt is- 20 mts  Follow up in office in 1 wk. See the [address] and Med rec forms  Follow up with specialists as instructed by them.   Advised the pt to call me in case of questions or worsening of symptoms. Pt voiced understanding of the instructions.   Condition and prognosis is guarded      Electronically signed by: Sarahy Monteiro MD, on 7/24/2022, at 9:38 PM

## 2022-07-25 NOTE — CARE COORDINATION
[facility] Transitions Initial Follow Up Call    Call within 2 business days of discharge: Yes    Patient: Ana Rodriguez Patient : 1950   MRN: [id number]  Reason for Admission: upper GIB  Discharge Date: 22 RARS: Readmission Risk Score: 11.3      Last Discharge [facility]       Date Complaint Diagnosis Description Type Department Provider    22 Rectal Bleeding Upper gastrointestinal bleed . .. ED to Hosp-Admission (Discharged) (ADMITTED) Cj Mendoza MD             Spoke with: DWIGHT    Facility: [facility]    Attempted to reach patient via phone for initial post hospital transition call.  left stating purpose of call along with my contact information requesting a return call.    [address]  Care Transitions  [phone]    Care Transitions 24 Hour Call    Care Transitions Interventions         Follow Up  Future Appointments   Date Time Provider Antwan Anne   8/15/2022  8:00 AM SCHEDULE, [facility] DEVICE CHECK [facility]   8/15/2022  8:30 AM Candi Moore MD [facility]   2022  4:15 PM SCHEDULE, [facility] REMOTE TRANSMISSION [facility]       Lissette Escobar [address], LPN

## 2022-07-26 ENCOUNTER — CARE COORDINATION (OUTPATIENT)
Dept: CASE MANAGEMENT | Age: 72
End: 2022-07-26

## 2022-07-26 DIAGNOSIS — I45.9 CARDIAC CONDUCTION DISORDER: Primary | ICD-10-CM

## 2022-07-26 PROCEDURE — 1111F DSCHRG MED/CURRENT MED MERGE: CPT | Performed by: NURSE PRACTITIONER

## 2022-07-26 NOTE — CARE COORDINATION
[facility] Transitions Initial Follow Up Call    Call within 2 business days of discharge: Yes    Patient: Justin Deleon Patient : 1950   MRN: [id number]  Reason for Admission: upper GIB   Discharge Date: 22 RARS: Readmission Risk Score: 11.3      Last Discharge [facility]       Date Complaint Diagnosis Description Type Department Provider    22 Rectal Bleeding Upper gastrointestinal bleed . .. ED to Hosp-Admission (Discharged) (ADMITTED) ELIZABETH 4N Yue Shay MD             Spoke with: Luz 1967: [facility]    Non-face-to-face services provided:  Obtained and reviewed discharge summary and/or continuity of care documents  Education of patient/family/caregiver/guardian to support self-management-monitoring for s/s blood loss, f/u  Assessment and support for treatment adherence and medication management-full medication reconciliation and 1111f completed    Care Transitions 24 Hour Call    Do you have a copy of your discharge instructions?: Yes  Do you have all of your prescriptions and are they filled?: Yes  Have you been contacted by a [address]?: No  Have you scheduled your follow up appointment?: Yes  How are you going to get to your appointment?: Car - drive self  Do you have support at home?: Partner/Spouse/SO  Do you feel like you have everything you need to keep you well at home?: Yes  Are you an active caregiver in your home?: No  Care Transitions Interventions         Transitions of Care Initial Call    Was this an external facility discharge? No Discharge Facility: NA    Challenges to be reviewed by the provider   Additional needs identified to be addressed with provider: No  none             Method of communication with provider : none    Advance Care Planning:   Does patient have an Advance Directive: reviewed and current. LPN Care Coordinator contacted the patient by telephone to perform post hospital discharge assessment.  Verified name and  with patient as identifiers. Provided introduction to self, and explanation of the LPN CC role. LPN CC reviewed discharge instructions, medical action plan and red flags with patient who verbalized understanding. Patient given an opportunity to ask questions and does not have any further questions or concerns at this time. Were discharge instructions available to patient? Yes. Reviewed appropriate site of care based on symptoms and resources available to patient including: PCP  Specialist. The patient agrees to contact the PCP office for questions related to their healthcare. Medication reconciliation was performed with patient, who verbalizes understanding of administration of home medications. Advised obtaining a 90-day supply of all daily and as-needed medications. Was patient discharged with a pulse oximeter? no    Patient verified  and was pleasant and agreeable to transition calls. Doing fine. States strength is still down, but improved. Some SOB that is below baseline. Denies palpitations, abdominal pain, N/V/D. Stool back to normal color & consistency. Appetite good. Denies problems with bowels or bladder. GI f/u November with EGD, colonoscopy. Plans to call PCP for f/u later in the year. Confirmed upcoming cardiology appts. Full medication reconciliation and 1111f completed. Denies needs. LPN CC provided contact information. No further follow-up call indicated based on severity of symptoms and risk factors.       Follow Up  Future Appointments   Date Time Provider Antwan Anne   8/15/2022  8:00 AM SCHEDULE, [facility] DEVICE CHECK [facility]   8/15/2022  8:30 AM Patricio Leyden, MD [facility]   2022  4:15 PM SCHEDULE, [facility] REMOTE TRANSMISSION [facility]       Lissette Moses LPN

## 2022-08-15 ENCOUNTER — NURSE ONLY (OUTPATIENT)
Dept: CARDIOLOGY CLINIC | Age: 72
End: 2022-08-15
Payer: MEDICARE

## 2022-08-15 ENCOUNTER — OFFICE VISIT (OUTPATIENT)
Dept: CARDIOLOGY CLINIC | Age: 72
End: 2022-08-15
Payer: MEDICARE

## 2022-08-15 VITALS — DIASTOLIC BLOOD PRESSURE: 78 MMHG | SYSTOLIC BLOOD PRESSURE: 140 MMHG | HEIGHT: 69 IN | BODY MASS INDEX: 33.89 KG/M2

## 2022-08-15 DIAGNOSIS — Z95.0 PACEMAKER: Primary | ICD-10-CM

## 2022-08-15 DIAGNOSIS — I48.0 PAROXYSMAL ATRIAL FIBRILLATION (HCC): ICD-10-CM

## 2022-08-15 DIAGNOSIS — I10 ESSENTIAL HYPERTENSION: ICD-10-CM

## 2022-08-15 DIAGNOSIS — I44.30 INFRA-HIS ATRIOVENTRICULAR BLOCK: Primary | ICD-10-CM

## 2022-08-15 DIAGNOSIS — R06.09 DOE (DYSPNEA ON EXERTION): ICD-10-CM

## 2022-08-15 DIAGNOSIS — Z95.0 PACEMAKER: ICD-10-CM

## 2022-08-15 DIAGNOSIS — I44.30 INFRA-HIS ATRIOVENTRICULAR BLOCK: ICD-10-CM

## 2022-08-15 DIAGNOSIS — I45.9 CARDIAC CONDUCTION DISORDER: ICD-10-CM

## 2022-08-15 PROCEDURE — 99213 OFFICE O/P EST LOW 20 MIN: CPT | Performed by: INTERNAL MEDICINE

## 2022-08-15 PROCEDURE — 1123F ACP DISCUSS/DSCN MKR DOCD: CPT | Performed by: INTERNAL MEDICINE

## 2022-08-15 PROCEDURE — 93280 PM DEVICE PROGR EVAL DUAL: CPT | Performed by: INTERNAL MEDICINE

## 2022-08-15 NOTE — PROGRESS NOTES
[address]   Cardiac Consultation    Referring Provider:  WILL Boyer CNP     Chief Complaint   Patient presents with    Follow-up     Afib - device check         HPI:  Isael Mccarthy is a 74 y.o. male with a PMH significant for HTN, HLD and PAfib that presented to [facility] 7/21/18 with his fourth episode of of abrupt syncope. He has had LBBB back to 2013 with prior EKG's in 2000 and 3/2008 stress not mentioning LBBB. He described the four episodes as being in various positions including kneeling. This one was a sudden LOC after < 1 sec of dizziness and feeling hot. He has no hx of chest pain or sob. Underwent EPS revealing infra-his block with subsequent PPM implant on 8/2/18. He had a couple hours of afib 10/9/20. We reviewed paf time he was here and he declined [address]. She also never followed up on sleep apnea. He was hospitalized at [facility] [place] from 7/22/2022 to 7/24/2022 for upper GI bleed. He underwent esophagogastroduodenoscopy with biopsy on 7/24/2022 which showed 2 small clean based gastric ulcers measuring 6-8mm in size, located in the gastric antrum, just proximal to the pyloric channel with no high risk stigmata. Mild gastric erythema in the gastric body, greater curvature. Non obstructive Schatzki ring at the GE junction. A 1 cm hiatal hernia and mild duodenitis in the duodenal bulb. Interval History: Today, he presents to office for follow up for atrial fibrillation. He states the had bleeding ulcer. He denies drinking ETOH and he was taken off of 325 mg ASA. He is compliant with his medications and tolerating them well. He denies chest pain/pressure, tightness, edema, heart racing, palpitations, lightheadedness, dizziness, syncope, presyncope,  PND or orthopnea. He states he had COVID in January and has notices he has more shortness of breath when walking up stairs but it has improved.      Past Medical History:   has a past medical history of Atrial Provider Last Rate Last Admin    cyanocobalamin injection 1,000 mcg  1,000 mcg IntraMUSCular Once Iona Johnston, APRN - CNP           Allergies:  Codeine and Hydrocodone     Review of Systems   Constitutional: Negative for activity change and appetite change. HENT: Negative for facial swelling and neck pain. Eyes: Negative for discharge and itching. Respiratory: chronic shortness of breath. Cardiovascular: Negative for palpitations. Negative for chest pain. Negative for leg swelling. Gastrointestinal: Negative for abdominal pain and abdominal distention. Genitourinary: Negative. Musculoskeletal: Negative. Skin: Negative for color change and pallor. Neurological: Negative for dizziness, syncope and light-headedness. Hematological: Negative. Psychiatric/Behavioral: Negative for behavioral problems and agitation. BP (!) 140/78 (Site: Left Upper Arm, Position: Sitting)   Ht 5' 9\" (1.753 m)   BMI 33.89 kg/m²     Objective:  Physical Exam   Nursing note and vitals reviewed. Constitutional: He appears well-developed and well-nourished. heavy  Head:  atraumatic. Eyes: Right eye exhibits no discharge. Left eye exhibits no discharge. Neck: Neck supple. Cardiovascular: Normal rate, regular rhythm and normal heart sounds. Pulmonary/Chest: Effort normal and breath sounds normal.   Abdominal: Soft. Musculoskeletal: He exhibits no edema. Neurological: He is alert without any gross abnormalities. Skin: Skin is warm and dry. +LCW  device   Psychiatric: He has a normal mood and affect. 24 hour holter: 7/2015  NSR with av block    Echo: 7/31/2018  Suboptimal image quality. Normal left ventricle size and systolic function with an estimated ejection  fraction of 55-60%. No regional wall motion abnormalities are seen. There is  mildly increased left ventricular wall thickness. Diastolic filling  parameters suggest grade I diastolic dysfunction.   Normal right ventricular size and function. Trivial mitral and tricuspid regurgitation. Stress Test: 12/2012  Normal myocardial perfusion scan with no evidence of myocardial ischemia or infarct. I independently reviewed and interpreted the ECG, MCOT, echocardiogram, stress test  results and used them for my plan of care. Assessment:    AV block with syncope:  -Consistent with paroxymal AV block with LBBB. -Underwent EPS with subsequent PPM implant.    - Device interrogated today and based on threshold, impedance and intrinsic sensing test run today the device appear to be functioning with establish parameters. Remaining battery life is 8 year 2 months  AP 28.6 %   99.7%    Paroxymal atrial fibrillation  - EKG today shows sinus and ventricular pacing  - Continue Toprol XL 25 mg daily.  -Device interrogation today sowed AT/AF burden 4 %, last episode on 8/14/2022 lasting 7 hours in duration.      - He has a [id number] Score 2 (HTN, AGE)  ~ 2 % stroke risk per year. - Not currently on [address] [place] [address]. - I again discussed oral anticoagulation to decrease the risk of thromboembolic events including stroke. Benefits and alternatives were discussed with patient. Risk of bleeding was discussed. Patient verbalized understanding. Different forms of anticoagulants including warfarin (Coumadin), Dabigatran (Pradaxa), Rivaroxaban (Xarelto), Apixaban (Eliquis), and Edoxaban [facility]) were discussed. - Patient again declines [address] [place] [address]. - History of recent GI bleed. - Atrial fibrillation risk factors including age, HTN, obesity, inactivity and CHHAYA were discussed with patient. Risk factor modification recommended. All questions were answered. HTN:  - Controlled. - Managed by PCP    Suspected sleep apnea  - Referral was previously placed to sleep medicine for evaluation but patient declined to follow up for evaluation.  - Encouraged to complete.      ARMENTA   -Began to notice after he was diagnosed with COVID Jan 2022   - Reports has been gradually improving.  - Etiology low blood count, due to GI bleed. H&H 9.6 & 28.3 (7/24/2022)     Plan:  1) Continue remote monitoring of device. 2) We again dicussed the importance of anticoagulation but with recent bleeding he is reluctant  3) Follow up in one year. This note was scribed in the presence of Howie Gaytan MD by Eryn Suarez RN. The scribes documentation has been prepared under my direction and personally reviewed by me in its entirety. I confirm that the note above accurately reflects all work, treatment, procedures, and medical decision making performed by me. Jabari Vasquez. Lauren FROST.    Cardiac Electrophysiology  [address], [address] [place] [address]  [facility] G[facility]  [phone]

## 2022-08-31 NOTE — PROGRESS NOTES
Device programming evaluation for patient's dual chamber pacemaker by company representative shows normal device function. Pt on Toprol XL; pt declines [address]. EP physician will review. See interrogation under cardiology tab in the [address] field for more details. Pt seeing [facility] today.

## 2022-09-27 RX ORDER — ASCORBIC ACID 500 MG
500 TABLET ORAL DAILY
COMMUNITY

## 2022-09-27 RX ORDER — M-VIT,TX,IRON,MINS/CALC/FOLIC 27MG-0.4MG
1 TABLET ORAL DAILY
COMMUNITY

## 2022-09-27 NOTE — PROGRESS NOTES
[address] [facility], [place] time _____0815_______        Surgery time _____0945_______    Take the following medications with a sip of water: Follow your MD/Surgeons pre-procedure instructions regarding your medications     Do not eat or drink anything after 12:00 midnight prior to your surgery. This includes water chewing gum, mints and ice chips. You may brush your teeth and gargle the morning of your surgery, but do not swallow the water     Please see your family doctor/pediatrician for a history and physical and/or concerning medications. Bring any test results/reports from your physicians office. If you are under the care of a heart doctor or specialist doctor, please be aware that you may be asked to them for clearance    You may be asked to stop blood thinners such as Coumadin, Plavix, Fragmin, Lovenox, etc., or any anti-inflammatories such as:  Aspirin, Ibuprofen, Advil, Naproxen prior to your surgery. We also ask that you stop any OTC medications such as fish oil, vitamin E, glucosamine, garlic, Multivitamins, COQ 10, etc.    We ask that you do not smoke 24 hours prior to surgery  We ask that you do not  drink any alcoholic beverages 24 hours prior to surgery     You must make arrangements for a responsible adult to take you home after your surgery. For your safety you will not be allowed to leave alone or drive yourself home. Your surgery will be cancelled if you do not have a ride home. Also for your safety, it is strongly suggested that someone stay with you the first 24 hours after your surgery. A parent or legal guardian must accompany a child scheduled for surgery and plan to stay at the hospital until the child is discharged. Please do not bring other children with you. For your comfort, please wear simple loose fitting clothing to the hospital.  Please do not bring valuables.     Do not wear any make-up or nail polish on your fingers or toes      For your safety, please do not wear any jewelry or body piercing's on the day of surgery. All jewelry must be removed. If you have dentures, they will be removed before going to operating room. For your convenience, we will provide you with a container. If you wear contact lenses or glasses, they will be removed, please bring a case for them. If you have a living will and a durable power of  for healthcare, please bring in a copy. As part of our patient safety program to minimize surgical site infections, we ask you to do the following:    Please notify your surgeon if you develop any illness between         now and the  day of your surgery. This includes a cough, cold, fever, sore throat, nausea,         or vomiting, and diarrhea, etc.   Please notify your surgeon if you experience dizziness, shortness         of breath or blurred vision between now and the time of your surgery. Do not shave your operative site 96 hours prior to surgery. For face and neck surgery, men may use an electric razor 48 hours   prior to surgery. You may shower the night before surgery or the morning of   your surgery with an antibacterial soap. You will need to bring a photo ID and insurance card    [facility] has an onsite pharmacy, would you like to utilize our pharmacy     If you will be staying overnight and use a C-pap machine, please bring   your C-pap to hospital     Our goal is to provide you with excellent care, therefore, visitors will be limited to two(2) in the room at a time so that we may focus on providing this care for you. Please contact pre-admission testing if you have any further questions. [facility] phone number:  [phone] PAT fax number:  [phone]  Please note these are generalized instructions for all surgical cases, you may be provided with more specific instructions according to your surgery.     C-Difficile admission screening and protocol:       * Admitted with diarrhea? [] YES    [x]  NO     *Prior history of C-Diff. In last 3 months? [] YES    [x]  NO     *Antibiotic use in the past 6-8 weeks? [x]  NO    []  YES                 If yes, which ANTIBIOTIC AND REASON______     *Prior hospitalization or nursing home in the last month? []  YES    [x]  NO        SAFETY FIRST. .call before you fall

## 2022-09-28 ENCOUNTER — NURSE ONLY (OUTPATIENT)
Dept: CARDIOLOGY CLINIC | Age: 72
End: 2022-09-28
Payer: MEDICARE

## 2022-09-28 DIAGNOSIS — Z95.0 PACEMAKER: Primary | ICD-10-CM

## 2022-09-28 DIAGNOSIS — I44.2 CHB (COMPLETE HEART BLOCK) (HCC): ICD-10-CM

## 2022-09-28 PROCEDURE — 93296 REM INTERROG EVL PM/IDS: CPT | Performed by: INTERNAL MEDICINE

## 2022-09-28 PROCEDURE — 93294 REM INTERROG EVL PM/LDLS PM: CPT | Performed by: INTERNAL MEDICINE

## 2022-09-30 ENCOUNTER — ANESTHESIA EVENT (OUTPATIENT)
Dept: ENDOSCOPY | Age: 72
End: 2022-09-30
Payer: MEDICARE

## 2022-10-03 ENCOUNTER — ANESTHESIA (OUTPATIENT)
Dept: ENDOSCOPY | Age: 72
End: 2022-10-03
Payer: MEDICARE

## 2022-10-03 ENCOUNTER — HOSPITAL ENCOUNTER (OUTPATIENT)
Age: 72
Setting detail: OUTPATIENT SURGERY
Discharge: HOME | End: 2022-10-03
Attending: INTERNAL MEDICINE | Admitting: INTERNAL MEDICINE
Payer: MEDICARE

## 2022-10-03 VITALS
DIASTOLIC BLOOD PRESSURE: 78 MMHG | HEIGHT: 69 IN | OXYGEN SATURATION: 98 % | RESPIRATION RATE: 17 BRPM | SYSTOLIC BLOOD PRESSURE: 152 MMHG | WEIGHT: 228.62 LBS | BODY MASS INDEX: 33.86 KG/M2 | HEART RATE: 67 BPM | TEMPERATURE: 96.9 F

## 2022-10-03 DIAGNOSIS — K25.9 GASTRIC ULCER, UNSPECIFIED CHRONICITY, UNSPECIFIED WHETHER GASTRIC ULCER HEMORRHAGE OR PERFORATION PRESENT: ICD-10-CM

## 2022-10-03 DIAGNOSIS — Z12.11 COLON CANCER SCREENING: ICD-10-CM

## 2022-10-03 LAB
GLUCOSE BLD-MCNC: 91 MG/DL (ref 70–99)
PERFORMED ON: NORMAL

## 2022-10-03 PROCEDURE — 88305 TISSUE EXAM BY PATHOLOGIST: CPT

## 2022-10-03 PROCEDURE — 3609010600 HC COLONOSCOPY POLYPECTOMY SNARE/COLD BIOPSY: Performed by: INTERNAL MEDICINE

## 2022-10-03 PROCEDURE — 7100000010 HC PHASE II RECOVERY - FIRST 15 MIN: Performed by: INTERNAL MEDICINE

## 2022-10-03 PROCEDURE — 7100000000 HC PACU RECOVERY - FIRST 15 MIN: Performed by: INTERNAL MEDICINE

## 2022-10-03 PROCEDURE — 2709999900 HC NON-CHARGEABLE SUPPLY: Performed by: INTERNAL MEDICINE

## 2022-10-03 PROCEDURE — 7100000001 HC PACU RECOVERY - ADDTL 15 MIN: Performed by: INTERNAL MEDICINE

## 2022-10-03 PROCEDURE — 3609017100 HC EGD: Performed by: INTERNAL MEDICINE

## 2022-10-03 PROCEDURE — 7100000011 HC PHASE II RECOVERY - ADDTL 15 MIN: Performed by: INTERNAL MEDICINE

## 2022-10-03 PROCEDURE — 3700000000 HC ANESTHESIA ATTENDED CARE: Performed by: INTERNAL MEDICINE

## 2022-10-03 PROCEDURE — 6360000002 HC RX W HCPCS

## 2022-10-03 PROCEDURE — 2580000003 HC RX 258: Performed by: ANESTHESIOLOGY

## 2022-10-03 PROCEDURE — 2500000003 HC RX 250 WO HCPCS

## 2022-10-03 PROCEDURE — 3700000001 HC ADD 15 MINUTES (ANESTHESIA): Performed by: INTERNAL MEDICINE

## 2022-10-03 RX ORDER — SODIUM CHLORIDE 0.9 % (FLUSH) 0.9 %
5-40 SYRINGE (ML) INJECTION PRN
Status: DISCONTINUED | OUTPATIENT
Start: 2022-10-03 | End: 2022-10-03 | Stop reason: HOSPADM

## 2022-10-03 RX ORDER — LIDOCAINE HYDROCHLORIDE 20 MG/ML
INJECTION, SOLUTION EPIDURAL; INFILTRATION; INTRACAUDAL; PERINEURAL PRN
Status: DISCONTINUED | OUTPATIENT
Start: 2022-10-03 | End: 2022-10-03 | Stop reason: SDUPTHER

## 2022-10-03 RX ORDER — ASPIRIN 81 MG/1
81 TABLET, CHEWABLE ORAL DAILY
COMMUNITY

## 2022-10-03 RX ORDER — PROPOFOL 10 MG/ML
INJECTION, EMULSION INTRAVENOUS CONTINUOUS PRN
Status: DISCONTINUED | OUTPATIENT
Start: 2022-10-03 | End: 2022-10-03 | Stop reason: SDUPTHER

## 2022-10-03 RX ORDER — SODIUM CHLORIDE 9 MG/ML
INJECTION, SOLUTION INTRAVENOUS CONTINUOUS
Status: DISCONTINUED | OUTPATIENT
Start: 2022-10-03 | End: 2022-10-03 | Stop reason: HOSPADM

## 2022-10-03 RX ORDER — PROPOFOL 10 MG/ML
INJECTION, EMULSION INTRAVENOUS PRN
Status: DISCONTINUED | OUTPATIENT
Start: 2022-10-03 | End: 2022-10-03 | Stop reason: SDUPTHER

## 2022-10-03 RX ORDER — SODIUM CHLORIDE 9 MG/ML
INJECTION, SOLUTION INTRAVENOUS PRN
Status: DISCONTINUED | OUTPATIENT
Start: 2022-10-03 | End: 2022-10-03 | Stop reason: HOSPADM

## 2022-10-03 RX ORDER — SODIUM CHLORIDE 0.9 % (FLUSH) 0.9 %
5-40 SYRINGE (ML) INJECTION EVERY 12 HOURS SCHEDULED
Status: DISCONTINUED | OUTPATIENT
Start: 2022-10-03 | End: 2022-10-03 | Stop reason: HOSPADM

## 2022-10-03 RX ADMIN — LIDOCAINE HYDROCHLORIDE 60 MG: 20 INJECTION, SOLUTION EPIDURAL; INFILTRATION; INTRACAUDAL; PERINEURAL at 09:19

## 2022-10-03 RX ADMIN — SODIUM CHLORIDE: 9 INJECTION, SOLUTION INTRAVENOUS at 09:10

## 2022-10-03 RX ADMIN — PROPOFOL 180 MCG/KG/MIN: 10 INJECTION, EMULSION INTRAVENOUS at 09:19

## 2022-10-03 RX ADMIN — PROPOFOL 60 MG: 10 INJECTION, EMULSION INTRAVENOUS at 09:19

## 2022-10-03 ASSESSMENT — PAIN SCALES - GENERAL
PAINLEVEL_OUTOF10: 0

## 2022-10-03 ASSESSMENT — LIFESTYLE VARIABLES: SMOKING_STATUS: 0

## 2022-10-03 ASSESSMENT — PAIN DESCRIPTION - LOCATION: LOCATION: ABDOMEN

## 2022-10-03 NOTE — PROGRESS NOTES
Pt to phase 2 from pacu. Pt a/o x4, assisted into chair at bedside, denies pain or nausea at this time. Pt given po snack, wife called to bedside.

## 2022-10-03 NOTE — PROGRESS NOTES
Pt sitting up in chair, tolerating PO snack, denies pain or nausea, states ready to leave. IV removed, discharge discussed with patient and wife. Pt taken to car via wheelchair, being driven home by wife.

## 2022-10-03 NOTE — ANESTHESIA POSTPROCEDURE EVALUATION
Department of Anesthesiology  Postprocedure Note    Patient: Briseida Rangel  MRN: [id number]  YOB: 1950  Date of evaluation: 10/3/2022      Procedure Summary     Date: 10/03/22 Room / Location: [address] [place] [address]    Anesthesia Start: 0333 Anesthesia Stop: 0949    Procedures:       ESOPHAGOGASTRODUODENOSCOPY      COLONOSCOPY POLYPECTOMY SNARE/COLD BIOPSY Diagnosis:       Gastric ulcer, unspecified chronicity, unspecified whether gastric ulcer hemorrhage or perforation present      Colon cancer screening      (GASTRIC ULCER, COLON CANCER SCREENING)    Surgeons: Romana Belle MD Responsible Provider: Inez Álvarez MD    Anesthesia Type: MAC ASA Status: 3          Anesthesia Type: MAC    Al Phase I: Al Score: 10    Al Phase II: Al Score: 10      Anesthesia Post Evaluation    Patient location during evaluation: PACU  Patient participation: complete - patient participated  Level of consciousness: awake  Airway patency: patent  Nausea & Vomiting: no nausea and no vomiting  Complications: no  Cardiovascular status: hemodynamically stable and blood pressure returned to baseline  Respiratory status: spontaneous ventilation, nonlabored ventilation and T-piece  Hydration status: stable  Comments: Mr. Mile Villalpando was seen resting comfortably following procedure. Appropriate for return to [facility] for planned discharge home.

## 2022-10-03 NOTE — OP NOTE
Endoscopy Note    Patient: Keily Cabrera  : 1950  Acct#:     Procedure: Esophagogastroduodenoscopy                       Colonoscopy with polypectomy (cold snare)    Date:  10/3/2022     Surgeon:   Gil Frederick MD    Referring Physician:  WILL Baker CNP    Previous Colonoscopy: YES  Date:    Greater than 3 years: NO    Indications: This is a 74y.o. year old male who presents today with Gastric Ulcer/Screening    Postoperative Diagnosis:  1. Schatzki's Ring 2. Hiatal hernia 3. Colon polyps 4. Moderate Left Colon Diverticulosis 5. Internal hemorrhoids     Anesthesia:  The patient was administered IV propofol per anesthesiology team.  Please see their operative records for full details. Consent:  The patient or their legal guardian has signed a consent, and is aware of the potential risks, benefits, alternatives, and potential complications of this procedure. These include, but are not limited to hemorrhage, bleeding, post procedural pain, perforation, phlebitis, aspiration, hypotension, hypoxia, cardiovascular events such as arryhthmia, and possibly death. Additionally, the possibility of missed colonic polyps and interval colon cancer was discussed in the consent. Description of Procedure: The patient was then taken to the endoscopy suite, placed in the left lateral decubitus position and the above IV sedation was administrered. The Olympus video endoscope was placed through the patient's oropharynx without difficulty to the extent of the 2nd portion of the duodenum. Both forward and retroflexed views of the stomach were obtained. Findings:    Esophagus: The esophagus has evidence of a distal esophageal Schatzki's ring. No dilated with no complaints of dysphagia. The esophagus otherwise appeared normal without evidence of Kwong's esophagus or reflux esophagitis. Stomach: The stomach had evidence of a small hiatal hernia on retroflex view.  The stomach otherwise appeared normal on forward and retroflexed views. No evidence of any residual ulcers. Duodenum: The first and 2nd portions of the duodenum appeared normal with normal villous pattern    The scope was then withdrawn back into the stomach, it was decompressed, and the scope was completely withdrawn. A digital rectal examination was performed and revealed negative without mass, lesions or tenderness. The Olympus video colonoscope was placed in the patient's rectum under digital direction and advanced to the cecum. The cecum was identified by characteristic anatomy and ballottment. The prep was excellent. The ileocecal valve was identified. The scope was then withdrawn back through the cecum, ascending, transverse, descending, sigmoid colon, and rectum. Careful circumferential examination of the mucosa in these areas demonstrated:    A 3 mm polyp on the ICV removed completely with cold snare polypectomy   A 10 mm polyp in the transverse colon removed completely with cold snare polypectomy   Multiple small and large mouthed diverticulum were present in the left colon    The scope was then withdrawn into the rectum and retroflexed. The retroflexed view of the anal verge and rectum demonstrates internal hemorrhoids. The scope was straightened, the colon was decompressed and the scope was withdrawn from the patient. The patient tolerated the procedure well and was taken to the PACU in good condition. Estimated Blood Loss (mL): < 5 CC     Complications: None    Specimens: Colon    Impression:    1) See post procedure diagnoses    Recommendations:   1) Await pathology results. 2) Gastric ulcers have healed. Ok to DC Pantoprazole. Recommend avoiding NSAIDs. 3) Recommend repeat colonoscopy in 3 years if the risk/benefit in after of a screening exam given age will be greater than 76. 4) The patient had biopsies taken today.   The patient should call for results in 7 days if they have not heard from our office. Our number is [phone].      Natalia Bowden MD  [address] and [address] [facility]

## 2022-10-03 NOTE — ANESTHESIA PRE PROCEDURE
Department of Anesthesiology  Preprocedure Note       Name:  Wing Bermeo   Age:  74 y.o.  :  1950                                          MRN:  [id number]         Date:  10/3/2022      Surgeon: Nahum Caldwell):  Rajani Campbell MD    Procedure: Procedure(s):  ESOPHAGOGASTRODUODENOSCOPY  COLONOSCOPY    Medications prior to admission:   Prior to Admission medications    Medication Sig Start Date End Date Taking? Authorizing Provider   aspirin 81 MG chewable tablet Take 81 mg by mouth daily   Yes Historical Provider, MD   Multiple Vitamins-Minerals (THERAPEUTIC MULTIVITAMIN-MINERALS) tablet Take 1 tablet by mouth daily   Yes Historical Provider, MD   Omega-3 Fatty Acids (FISH OIL BURP-LESS PO) Take by mouth daily   Yes Historical Provider, MD   vitamin C (ASCORBIC ACID) 500 MG tablet Take 500 mg by mouth daily   Yes Historical Provider, MD   pantoprazole (PROTONIX) 40 MG tablet Take 1 tablet by mouth in the morning and 1 tablet in the evening. Take before meals.  22   Michelle Ruiz MD   metoprolol succinate (TOPROL XL) 25 MG extended release tablet Take 1 tablet by mouth daily 18   Papo Shaffer MD   lisinopril (PRINIVIL;ZESTRIL) 40 MG tablet TAKE ONE TABLET BY MOUTH DAILY 3/14/17   Sadie Goodson MD   PROAIR  (90 BASE) MCG/ACT inhaler INHALE TWO PUFFS BY MOUTH FOUR TIMES A DAY AS NEEDED  Patient taking differently: Inhale 2 puffs into the lungs every 6 hours as needed for Shortness of Breath or Wheezing 17   Michelle Ruiz MD   doxazosin (CARDURA) 8 MG tablet Take 1 tablet by mouth nightly 17   WILL Wells CNP   budesonide-formoterol (SYMBICORT) 160-4.5 MCG/ACT AERO Inhale 2 puffs into the lungs 2 times daily  Patient taking differently: Inhale 2 puffs into the lungs daily 17   WILL Wells CNP   fluticasone (FLONASE) 50 MCG/ACT nasal spray PLACE 2 SPRAYS IN EACH NOSTRIL DAILY  Patient taking differently: 1 spray by Nasal route daily as needed 1/11/17   Ally Tian MD   cyanocobalamin 1000 MCG/ML injection Inject 1 mL into the muscle once for 1 dose 12/20/16 7/24/22  WILL Jernigan CNP       Current medications:    Current Facility-Administered Medications   Medication Dose Route Frequency Provider Last Rate Last Admin    0.9 % sodium chloride infusion   IntraVENous Continuous Penny Vazquez MD        sodium chloride flush 0.9 % injection 5-40 mL  5-40 mL IntraVENous 2 times per day Penny Vazquez MD        sodium chloride flush 0.9 % injection 5-40 mL  5-40 mL IntraVENous PRN Penny Vazquez MD        0.9 % sodium chloride infusion   IntraVENous PRN Penny Vazquez MD           Allergies:    No Known Allergies    Problem List:    Patient Active Problem List   Diagnosis Code    [facility] ([facility] I42.9    HTN (hypertension) I10    Cardiac dysrhythmia I49.9    SOB (shortness of breath) R06.02    Allergic rhinitis J30.9    FH: chronic lung disease requiring oxygen Z82.5    FH: diabetes mellitus Z83.3    FHx: hyperlipidemia Z83.438    Hyperlipidemia E78.5    BPH (benign prostatic hyperplasia) N40.0    A-fib (HCC) I48.91    Gastric polyp K31.7    Syncope R55    PA (pernicious anemia) D51.0    Colon polyp K63.5    LBBB (left bundle branch block) I44.7    Autonomic dysfunction G90.9    Cerumen impaction H61.20    AB (asthmatic bronchitis) J45.909    Otitis H66.90    Hyperlipidemia E78.5    Cardiac conduction disorder I45.9    Pacemaker Z95.0    Infra-HIS atrioventricular block I44.30    Upper gastrointestinal bleed K92.2       Past Medical History:        Diagnosis Date    Atrial fibrillation ([facility]     Colon polyp 09/03/2008    COPD (chronic obstructive pulmonary disease) (HCC)     Enlarged prostate     FHx: hyperlipidemia 04/09/1997    GERD (gastroesophageal reflux disease)     History of bleeding ulcers 2022    History of echocardiogram 2009    Hypertension     Pacemaker 2019    [employer]    Wears glasses        Past Surgical History:        Procedure Laterality Date    CARDIAC CATHETERIZATION      COLONOSCOPY      colon polyp    PROSTATE BIOPSY  2016    per patient     TURP  06/29/2016    with cysto    UPPER GASTROINTESTINAL ENDOSCOPY N/A 07/24/2022    EGD BIOPSY performed by Fauzia Robles MD at [facility] History:    Social History     Tobacco Use    Smoking status: Never    Smokeless tobacco: Never   Substance Use Topics    Alcohol use: Yes     Comment: seldom                                Counseling given: Not Answered      Vital Signs (Current):   Vitals:    09/27/22 1154 10/03/22 0824   Weight: 230 lb (104.3 kg) 228 lb 9.9 oz (103.7 kg)   Height: 5' 9\" (1.753 m) 5' 9\" (1.753 m)                                              BP Readings from Last 3 Encounters:   08/15/22 (!) 140/78   07/24/22 (!) 157/81   07/29/21 (!) 148/80       NPO Status: Time of last liquid consumption: 0300                        Time of last solid consumption: 1800                        Date of last liquid consumption: 10/03/22                        Date of last solid food consumption: 10/01/22    BMI:   Wt Readings from Last 3 Encounters:   10/03/22 228 lb 9.9 oz (103.7 kg)   07/24/22 229 lb 8 oz (104.1 kg)   07/29/21 233 lb (105.7 kg)     Body mass index is 33.76 kg/m².     CBC:   Lab Results   Component Value Date/Time    WBC 3.6 07/24/2022 10:28 AM    RBC 3.20 07/24/2022 10:28 AM    HGB 9.6 07/24/2022 10:28 AM    HCT 28.3 07/24/2022 10:28 AM    MCV 88.3 07/24/2022 10:28 AM    RDW 13.6 07/24/2022 10:28 AM     07/24/2022 10:28 AM       CMP:   Lab Results   Component Value Date/Time     07/22/2022 03:36 PM    K 3.8 07/22/2022 03:36 PM     07/22/2022 03:36 PM    CO2 26 07/22/2022 03:36 PM    BUN 55 07/22/2022 03:36 PM    CREATININE 1.0 07/22/2022 03:36 PM    GFRAA >60 07/22/2022 03:36 PM    GFRAA >60 01/10/2013 06:35 AM    GFRAA >60 01/10/2013 06:35 AM    AGRATIO 1.8 09/12/2017 09:44 AM LABGLOM >60 07/22/2022 03:36 PM    GLUCOSE 105 07/22/2022 03:36 PM    PROT 6.6 09/12/2017 09:44 AM    PROT 6.0 01/10/2013 06:35 AM    CALCIUM 9.5 07/22/2022 03:36 PM    BILITOT 0.4 09/12/2017 09:44 AM    ALKPHOS 79 09/12/2017 09:44 AM    AST 13 09/12/2017 09:44 AM    ALT 12 09/12/2017 09:44 AM       POC Tests: No results for input(s): POCGLU, POCNA, POCK, POCCL, POCBUN, POCHEMO, POCHCT in the last 72 hours. Coags:   Lab Results   Component Value Date/Time    PROTIME 10.8 06/23/2016 07:50 AM    INR 0.95 06/23/2016 07:50 AM    APTT 32.7 06/23/2016 07:50 AM       HCG (If Applicable): No results found for: PREGTESTUR, PREGSERUM, HCG, HCGQUANT     ABGs: No results found for: PHART, PO2ART, BNQ3HKF, QNI9RIA, BEART, R7RELABK     Type & Screen (If Applicable):  No results found for: LABABO, LABRH    Drug/Infectious Status (If Applicable):  No results found for: HIV, HEPCAB    COVID-19 Screening (If Applicable): No results found for: COVID19        Anesthesia Evaluation   no history of anesthetic complications:   Airway: Mallampati: III  TM distance: >3 FB     Mouth opening: > = 3 FB   Dental:      Comment: Denies loose teeth. Pulmonary:   (+) COPD:  decreased breath sounds asthma:     (-) wheezes, rales and not a current smoker                          ROS comment: Supplemental oxygen requirement   Cardiovascular:  Exercise tolerance: no interval change,   (+) hypertension:, pacemaker:, dysrhythmias (BBB): atrial fibrillation, hyperlipidemia    (-) orthopnea,  ARMENTA and weak pulses        Rate: normal                 ROS comment: EKG:  Electronic ventricular pacemaker   atrial sensed ventricular paced rhythm    Echocardiogram:  Summary:  Suboptimal image quality. Normal left ventricle size and systolic function with an estimated ejection fraction of 55-60%. No regional wall motion abnormalities are seen. There is mildly increased left ventricular wall thickness.  Diastolic filling parameters suggest grade I diastolic dysfunction. Normal right ventricular size and function. Trivial mitral and tricuspid regurgitation. Per cardiology note:  Transmission shows normal sensing and pacing function. Pt has known AF, 3.4% burden (Toprol XL, declined OAC, off ASA due to bleeding ulcer). AP 37.9%   99.9% (MVP On)  Exact pacemaker model and settings not written. Neuro/Psych:      (-) seizures, TIA and CVA           GI/Hepatic/Renal:   (+) GERD:, PUD, bowel prep,      (-) liver disease, no renal disease and no morbid obesity      ROS comment: + Black tarry stool  + History of gastric polyps/ulcer  + History of colon polyps. Endo/Other:    (+) blood dyscrasia (last H&H 9.6/28.3, not on oral anticogulant, aspirin held since July)::., .    (-) diabetes mellitusElectrolyte problem: elevated BUN likely 2/2 GI bleed. Abdominal:   (+) obese,     Abdomen: soft. Vascular: Other Findings: Pacemaker generator palpated over left upper chest. Denies orthopnea or ARMENTA. Patient to self-administer home albuterol inhaler prior to procedure. Anesthesia Plan      MAC     ASA 3     (NPO appropriate. Mr. Nelson Land denies active nausea / reflux.)        Anesthetic plan and risks discussed with patient (spouse at bedside). Use of blood products discussed with patient whom consented to blood products. Plan discussed with CRNA. This pre-anesthesia assessment may be used as a history and physical.    DOS STAFF ADDENDUM:    Pt seen and examined, chart reviewed (including anesthesia, drug and allergy history). No interval changes to history and physical examination. Anesthetic plan, risks, benefits, alternatives, and personnel involved discussed with patient. Patient verbalized an understanding and agrees to proceed.       Jessica Navarrete MD  October 3, 2022  8:32 AM

## 2022-10-03 NOTE — DISCHARGE INSTRUCTIONS
Recommendations:   1) Await pathology results. 2) Gastric ulcers have healed. Ok to DC Pantoprazole. Recommend avoiding NSAIDs. 3) Recommend repeat colonoscopy in 3 years if the risk/benefit in after of a screening exam given age will be greater than 76. 4) The patient had biopsies taken today. The patient should call for results in 7 days if they have not heard from our office. Our number is [phone]. Discharge Instructions for Colonoscopy     Colonoscopy is a visual exam of the lining of the large intestine, also called the bowel or colon, with a colonoscope. A colonoscope is a flexible tube with a light and a viewing device. It allows the doctor to view the inside of the colon through a tiny video camera. Colonoscopy is performed for many reasons: unexplained anemia , pain, diarrhea , bloody stools, cancer screening, among many other reasons. Complications from a colonoscopy are rare. Some possible serious complications include perforated bowel (which might require surgery) and bleeding (which could require blood transfusion ). Minor complications include bloating, gas, and cramping that can last for 1-2 days after the procedure. Because air is put into your colon during the procedure, it is normal to pass large amounts of air from your rectum. You may not have a bowel movement for 1-3 days after the procedure. What You Will Need:  Someone to drive you home after the procedure     Steps to Take:  [address] when you get home. Because the sedative will make you drowsy, don't drive, operate machinery, or make important decisions the day of the procedure. Feelings of bloating, gas, or cramping may persist for 24 hours. Diet -  Try sips of water first. If tolerated, resume bland food (scrambled eggs, toast, soup) first.  If tolerated, resume regular diet or the diet recommended by your physician. Do not drink alcohol for 24 hours.    Physical Activity -  Ask your doctor when you will be able to return to work. Do not drive, operate heavy machinery, or do activities that require coordination or balance for 24 hours. Otherwise, return to your normal routine as soon as you are comfortable to do so, which is usually the next day after the procedure. Medications - When taking medications, it's important to: Take your medication as directed, not more, not less, not at a different time. Do not stop taking them without consulting your healthcare provider. Don't share them with anyone else. Know what effects and side effects to expect, and report them to your healthcare provider. If you are taking more than one drug, even if it is an over-the-counter medication, herb, or dietary supplement, be sure to check with a physician or pharmacist about drug interactions. Plan ahead for refills so you don't run out. Lifestyle Changes - The results of your colonoscopy will determine if any lifestyle changes are necessary. Follow-up:  The doctor will usually give you a preliminary report after the medication wears off and you are more alert. The results from a biopsy can take as long as 1-2 weeks to be completed. Schedule a follow-up appointment as directed by your doctor. You should schedule a follow-up colonoscopy as recommended by your doctor. Call Your Doctor If Any of the Following Occurs:  Bleeding from your rectum; notify your doctor if you pass a teaspoonful or more of blood   Black, tarry stools   Severe abdominal pain   Hard, swollen abdomen   Signs of infection, including fever or chills   Inability to pass gas or stool   Coughing, shortness of breath, chest pain, severe nausea or vomiting     In case of an emergency, call 911 immediately.

## 2022-10-03 NOTE — H&P
BY MOUTH FOUR TIMES A DAY AS NEEDED  Patient taking differently: Inhale 2 puffs into the lungs every 6 hours as needed for Shortness of Breath or Wheezing 2/7/17   Yani Noyola MD   doxazosin (CARDURA) 8 MG tablet Take 1 tablet by mouth nightly 2/7/17   WILL Ghosh CNP   budesonide-formoterol (SYMBICORT) 160-4.5 MCG/ACT AERO Inhale 2 puffs into the lungs 2 times daily  Patient taking differently: Inhale 2 puffs into the lungs daily 1/17/17   WILL Ghosh CNP   fluticasone (FLONASE) 50 MCG/ACT nasal spray PLACE 2 SPRAYS IN EACH NOSTRIL DAILY  Patient taking differently: 1 spray by Nasal route daily as needed 1/11/17   Yani Noyola MD   cyanocobalamin 1000 MCG/ML injection Inject 1 mL into the muscle once for 1 dose 12/20/16 7/24/22  WILL Leos CNP       Allergies:  Patient has no known allergies. Social History:   TOBACCO:   reports that he has never smoked. He has never used smokeless tobacco.  ETOH:   reports current alcohol use. DRUGS:   reports no history of drug use. PHYSICAL EXAM:      Vital Signs: BP (!) 149/99   Pulse 84   Temp 97 °F (36.1 °C) (Temporal)   Resp 18   Ht 5' 9\" (1.753 m)   Wt 228 lb 9.9 oz (103.7 kg)   SpO2 98%   BMI 33.76 kg/m²    Airway: No stridor or wheezing noted. Good air movement  Pulmonary: without wheezes. Clear to auscultation  Cardiac:regular rate and rhythm without loud murmurs  Abdomen:soft, nontender,  Bowel sounds present    Pre-Procedure Assessment / Plan:  1) EGD/Colonoscopy    ASA Grade:  ASA 3 - Patient with moderate systemic disease with functional limitations  Mallampati Classification:  Class III    Level of Sedation Plan:Deep sedation    Post Procedure plan: Return to same level of care    I assessed the patient and find that the patient is in satisfactory condition to proceed with the planned procedure and sedation plan.     I have explained the risk, benefits, and alternatives to the procedure; the patient understands and agrees to proceed.        Judah Goode MD  10/3/2022

## 2023-01-04 ENCOUNTER — NURSE ONLY (OUTPATIENT)
Dept: CARDIOLOGY CLINIC | Age: 73
End: 2023-01-04
Payer: MEDICARE

## 2023-01-04 DIAGNOSIS — I44.30 INFRA-HIS ATRIOVENTRICULAR BLOCK: ICD-10-CM

## 2023-01-04 DIAGNOSIS — Z95.0 PACEMAKER: Primary | ICD-10-CM

## 2023-01-04 PROCEDURE — 93296 REM INTERROG EVL PM/IDS: CPT | Performed by: INTERNAL MEDICINE

## 2023-01-04 PROCEDURE — 93294 REM INTERROG EVL PM/LDLS PM: CPT | Performed by: INTERNAL MEDICINE

## 2023-01-10 NOTE — PROGRESS NOTES
We received remote transmission from patient's dual chamber pacemaker monitor at home. Transmission shows normal sensing and pacing function. Pt has known AF, 4.9% burden (Toprol XL, declined OAC, hx of GI bleed). Ap 30.5%   99.9% (MVP On)  Echo 07.2018 showed EF of 55-60%. EP physician will review. See interrogation under cardiology tab in the [address] [place] [address] [address] field for more details. Will continue to monitor remotely.      (End of 91-day monitoring period 1/4/23)

## 2023-05-03 ENCOUNTER — NURSE ONLY (OUTPATIENT)
Dept: CARDIOLOGY CLINIC | Age: 73
End: 2023-05-03

## 2023-05-03 DIAGNOSIS — Z95.0 PACEMAKER: Primary | ICD-10-CM

## 2023-05-03 DIAGNOSIS — R55 SYNCOPE, UNSPECIFIED SYNCOPE TYPE: ICD-10-CM

## 2023-05-03 DIAGNOSIS — I45.9 CARDIAC CONDUCTION DISORDER: ICD-10-CM

## 2023-05-11 NOTE — PROGRESS NOTES
We received remote transmission from patient's dual chamber pacemaker monitor at home. Transmission shows normal sensing and pacing function. NSVT noted (Toprol). Pt has known AF, 6.0% burden (Toprol XL, declined OAC, hx of GI bleed). Ap 26.3%   99.9% (MVP On)  Echo 07.2018 showed EF of 55-60%. EP physician will review. See interrogation under cardiology tab in the [address] [place] [address] [address] field for more details. Will continue to monitor remotely.      (End of 91-day monitoring period 5/3/23)

## 2023-08-31 PROCEDURE — 93294 REM INTERROG EVL PM/LDLS PM: CPT | Performed by: INTERNAL MEDICINE

## 2023-08-31 PROCEDURE — 93296 REM INTERROG EVL PM/IDS: CPT | Performed by: INTERNAL MEDICINE

## 2023-09-11 NOTE — PROGRESS NOTES
time.    - I discussed oral anticoagulation to decrease the risk of thromboembolic events including stroke. Benefits and alternatives were discussed with patient. Risk of bleeding was discussed. Patient verbalized understanding.     - Patient again declines [address] - History of GI bleed. - Atrial fibrillation risk factors including age, HTN, obesity, inactivity and CHHAYA were discussed with patient. Risk factor modification recommended. All questions were answered. Dyspnea on exertion persists and worse  NM stress test to assess for ischemia. Echo to assess heart and valvular function. Essential hypertension  - Controlled. - Managed by PCP    Suspected sleep apnea  - Referral was previously placed to sleep medicine for evaluation but patient declined to follow up for evaluation.  - Encouraged to complete. Plan:  1) Continue remote monitoring of device. 2) We again dicussed the importance of anticoagulation but with GI bleeding he is reluctant. 3) Echo and NM stress test, will call with results. 3) Follow up in one year with Alicia Brewer MD    This note was scribed in the presence of Linda Larkin MD by Ronald Singer RN. The scribes documentation has been prepared under my direction and personally reviewed by me in its entirety. I confirm that the note above accurately reflects all work, treatment, procedures, and medical decision making performed by me. Kasie Miller. Lauren FROST.    Cardiac Electrophysiology  [address], [address] [place] [address]  [phone]

## 2023-09-12 ENCOUNTER — NURSE ONLY (OUTPATIENT)
Dept: CARDIOLOGY CLINIC | Age: 73
End: 2023-09-12
Payer: MEDICARE

## 2023-09-12 ENCOUNTER — OFFICE VISIT (OUTPATIENT)
Dept: CARDIOLOGY CLINIC | Age: 73
End: 2023-09-12
Payer: MEDICARE

## 2023-09-12 VITALS
OXYGEN SATURATION: 94 % | HEIGHT: 69 IN | DIASTOLIC BLOOD PRESSURE: 80 MMHG | WEIGHT: 234.2 LBS | BODY MASS INDEX: 34.69 KG/M2 | SYSTOLIC BLOOD PRESSURE: 140 MMHG | HEART RATE: 69 BPM

## 2023-09-12 DIAGNOSIS — Z95.0 PACEMAKER: Primary | ICD-10-CM

## 2023-09-12 DIAGNOSIS — Z95.0 PACEMAKER: ICD-10-CM

## 2023-09-12 DIAGNOSIS — R29.818 SUSPECTED SLEEP APNEA: ICD-10-CM

## 2023-09-12 DIAGNOSIS — R06.09 DOE (DYSPNEA ON EXERTION): ICD-10-CM

## 2023-09-12 DIAGNOSIS — I44.30 INFRA-HIS ATRIOVENTRICULAR BLOCK: ICD-10-CM

## 2023-09-12 DIAGNOSIS — I48.0 PAROXYSMAL ATRIAL FIBRILLATION (HCC): ICD-10-CM

## 2023-09-12 DIAGNOSIS — I48.0 PAROXYSMAL ATRIAL FIBRILLATION (HCC): Primary | ICD-10-CM

## 2023-09-12 DIAGNOSIS — I44.7 LBBB (LEFT BUNDLE BRANCH BLOCK): ICD-10-CM

## 2023-09-12 DIAGNOSIS — I10 ESSENTIAL HYPERTENSION: ICD-10-CM

## 2023-09-12 DIAGNOSIS — I42.9 CARDIOMYOPATHY, UNSPECIFIED TYPE (HCC): ICD-10-CM

## 2023-09-12 DIAGNOSIS — I44.2 CHB (COMPLETE HEART BLOCK) (HCC): ICD-10-CM

## 2023-09-12 PROCEDURE — 3077F SYST BP >= 140 MM HG: CPT | Performed by: INTERNAL MEDICINE

## 2023-09-12 PROCEDURE — 93000 ELECTROCARDIOGRAM COMPLETE: CPT | Performed by: INTERNAL MEDICINE

## 2023-09-12 PROCEDURE — 1123F ACP DISCUSS/DSCN MKR DOCD: CPT | Performed by: INTERNAL MEDICINE

## 2023-09-12 PROCEDURE — 93280 PM DEVICE PROGR EVAL DUAL: CPT | Performed by: INTERNAL MEDICINE

## 2023-09-12 PROCEDURE — 3079F DIAST BP 80-89 MM HG: CPT | Performed by: INTERNAL MEDICINE

## 2023-09-12 PROCEDURE — 99214 OFFICE O/P EST MOD 30 MIN: CPT | Performed by: INTERNAL MEDICINE

## 2023-11-30 PROCEDURE — 93294 REM INTERROG EVL PM/LDLS PM: CPT | Performed by: INTERNAL MEDICINE

## 2023-11-30 PROCEDURE — 93296 REM INTERROG EVL PM/IDS: CPT | Performed by: INTERNAL MEDICINE

## 2023-12-29 ENCOUNTER — HOSPITAL ENCOUNTER (OUTPATIENT)
Age: 73
Setting detail: OBSERVATION
Discharge: HOME | End: 2023-12-30
Attending: EMERGENCY MEDICINE | Admitting: INTERNAL MEDICINE
Payer: MEDICARE

## 2023-12-29 ENCOUNTER — APPOINTMENT (OUTPATIENT)
Dept: CT IMAGING | Age: 73
End: 2023-12-29
Payer: MEDICARE

## 2023-12-29 DIAGNOSIS — H53.2 BINOCULAR VISION DISORDER WITH DIPLOPIA: Primary | ICD-10-CM

## 2023-12-29 DIAGNOSIS — R51.9 ACUTE NONINTRACTABLE HEADACHE, UNSPECIFIED HEADACHE TYPE: ICD-10-CM

## 2023-12-29 DIAGNOSIS — D51.0 PERNICIOUS ANEMIA: ICD-10-CM

## 2023-12-29 LAB
ANION GAP SERPL CALCULATED.3IONS-SCNC: 7 MMOL/L (ref 3–16)
BASOPHILS # BLD: 0 K/UL (ref 0–0.2)
BASOPHILS NFR BLD: 0.2 %
BUN SERPL-MCNC: 15 MG/DL (ref 7–20)
CALCIUM SERPL-MCNC: 10 MG/DL (ref 8.3–10.6)
CHLORIDE SERPL-SCNC: 102 MMOL/L (ref 99–110)
CO2 SERPL-SCNC: 31 MMOL/L (ref 21–32)
CREAT SERPL-MCNC: 0.7 MG/DL (ref 0.8–1.3)
DEPRECATED RDW RBC AUTO: 13.3 % (ref 12.4–15.4)
EOSINOPHIL # BLD: 0 K/UL (ref 0–0.6)
EOSINOPHIL NFR BLD: 0.4 %
GFR SERPLBLD CREATININE-BSD FMLA CKD-EPI: >60 ML/MIN/{1.73_M2}
GLUCOSE SERPL-MCNC: 122 MG/DL (ref 70–99)
HCT VFR BLD AUTO: 45.6 % (ref 40.5–52.5)
HGB BLD-MCNC: 15.8 G/DL (ref 13.5–17.5)
LYMPHOCYTES # BLD: 0.5 K/UL (ref 1–5.1)
LYMPHOCYTES NFR BLD: 7.5 %
MCH RBC QN AUTO: 29.7 PG (ref 26–34)
MCHC RBC AUTO-ENTMCNC: 34.6 G/DL (ref 31–36)
MCV RBC AUTO: 85.8 FL (ref 80–100)
MONOCYTES # BLD: 0.3 K/UL (ref 0–1.3)
MONOCYTES NFR BLD: 4 %
NEUTROPHILS # BLD: 6.4 K/UL (ref 1.7–7.7)
NEUTROPHILS NFR BLD: 87.9 %
PLATELET # BLD AUTO: 151 K/UL (ref 135–450)
PMV BLD AUTO: 8.4 FL (ref 5–10.5)
POTASSIUM SERPL-SCNC: 4.1 MMOL/L (ref 3.5–5.1)
RBC # BLD AUTO: 5.31 M/UL (ref 4.2–5.9)
SODIUM SERPL-SCNC: 140 MMOL/L (ref 136–145)
TROPONIN, HIGH SENSITIVITY: 9 NG/L (ref 0–22)
WBC # BLD AUTO: 7.2 K/UL (ref 4–11)

## 2023-12-29 PROCEDURE — 70450 CT HEAD/BRAIN W/O DYE: CPT

## 2023-12-29 PROCEDURE — 6360000002 HC RX W HCPCS: Performed by: EMERGENCY MEDICINE

## 2023-12-29 PROCEDURE — 2580000003 HC RX 258: Performed by: EMERGENCY MEDICINE

## 2023-12-29 PROCEDURE — 2580000003 HC RX 258: Performed by: INTERNAL MEDICINE

## 2023-12-29 PROCEDURE — 96374 THER/PROPH/DIAG INJ IV PUSH: CPT

## 2023-12-29 PROCEDURE — 6360000002 HC RX W HCPCS: Performed by: INTERNAL MEDICINE

## 2023-12-29 PROCEDURE — 96375 TX/PRO/DX INJ NEW DRUG ADDON: CPT

## 2023-12-29 PROCEDURE — 99285 EMERGENCY DEPT VISIT HI MDM: CPT

## 2023-12-29 PROCEDURE — G0378 HOSPITAL OBSERVATION PER HR: HCPCS

## 2023-12-29 PROCEDURE — 80048 BASIC METABOLIC PNL TOTAL CA: CPT

## 2023-12-29 PROCEDURE — 84484 ASSAY OF TROPONIN QUANT: CPT

## 2023-12-29 PROCEDURE — 96361 HYDRATE IV INFUSION ADD-ON: CPT

## 2023-12-29 PROCEDURE — 6370000000 HC RX 637 (ALT 250 FOR IP): Performed by: INTERNAL MEDICINE

## 2023-12-29 PROCEDURE — 85025 COMPLETE CBC W/AUTO DIFF WBC: CPT

## 2023-12-29 PROCEDURE — 96376 TX/PRO/DX INJ SAME DRUG ADON: CPT

## 2023-12-29 RX ORDER — ONDANSETRON 2 MG/ML
4 INJECTION INTRAMUSCULAR; INTRAVENOUS EVERY 6 HOURS PRN
Status: DISCONTINUED | OUTPATIENT
Start: 2023-12-29 | End: 2023-12-30 | Stop reason: HOSPADM

## 2023-12-29 RX ORDER — SODIUM CHLORIDE 0.9 % (FLUSH) 0.9 %
5-40 SYRINGE (ML) INJECTION PRN
Status: DISCONTINUED | OUTPATIENT
Start: 2023-12-29 | End: 2023-12-30 | Stop reason: HOSPADM

## 2023-12-29 RX ORDER — LISINOPRIL 40 MG/1
40 TABLET ORAL DAILY
Status: DISCONTINUED | OUTPATIENT
Start: 2023-12-30 | End: 2023-12-30 | Stop reason: HOSPADM

## 2023-12-29 RX ORDER — POLYETHYLENE GLYCOL 3350 17 G/17G
17 POWDER, FOR SOLUTION ORAL DAILY PRN
Status: DISCONTINUED | OUTPATIENT
Start: 2023-12-29 | End: 2023-12-30 | Stop reason: HOSPADM

## 2023-12-29 RX ORDER — ONDANSETRON 4 MG/1
4 TABLET, ORALLY DISINTEGRATING ORAL EVERY 8 HOURS PRN
Status: DISCONTINUED | OUTPATIENT
Start: 2023-12-29 | End: 2023-12-30 | Stop reason: HOSPADM

## 2023-12-29 RX ORDER — PROCHLORPERAZINE EDISYLATE 5 MG/ML
10 INJECTION INTRAMUSCULAR; INTRAVENOUS EVERY 6 HOURS PRN
Status: DISCONTINUED | OUTPATIENT
Start: 2023-12-29 | End: 2023-12-30 | Stop reason: HOSPADM

## 2023-12-29 RX ORDER — ASPIRIN 300 MG/1
300 SUPPOSITORY RECTAL DAILY
Status: DISCONTINUED | OUTPATIENT
Start: 2023-12-30 | End: 2023-12-30

## 2023-12-29 RX ORDER — HYDRALAZINE HYDROCHLORIDE 20 MG/ML
10 INJECTION INTRAMUSCULAR; INTRAVENOUS EVERY 6 HOURS PRN
Status: DISCONTINUED | OUTPATIENT
Start: 2023-12-29 | End: 2023-12-30 | Stop reason: HOSPADM

## 2023-12-29 RX ORDER — ASPIRIN 81 MG/1
81 TABLET, CHEWABLE ORAL DAILY
Status: DISCONTINUED | OUTPATIENT
Start: 2023-12-30 | End: 2023-12-30

## 2023-12-29 RX ORDER — PANTOPRAZOLE SODIUM 40 MG/1
40 TABLET, DELAYED RELEASE ORAL
Status: DISCONTINUED | OUTPATIENT
Start: 2023-12-30 | End: 2023-12-30 | Stop reason: HOSPADM

## 2023-12-29 RX ORDER — ENOXAPARIN SODIUM 100 MG/ML
30 INJECTION SUBCUTANEOUS 2 TIMES DAILY
Status: DISCONTINUED | OUTPATIENT
Start: 2023-12-30 | End: 2023-12-30 | Stop reason: ALTCHOICE

## 2023-12-29 RX ORDER — SODIUM CHLORIDE 0.9 % (FLUSH) 0.9 %
5-40 SYRINGE (ML) INJECTION EVERY 12 HOURS SCHEDULED
Status: DISCONTINUED | OUTPATIENT
Start: 2023-12-29 | End: 2023-12-30 | Stop reason: HOSPADM

## 2023-12-29 RX ORDER — 0.9 % SODIUM CHLORIDE 0.9 %
500 INTRAVENOUS SOLUTION INTRAVENOUS ONCE
Status: COMPLETED | OUTPATIENT
Start: 2023-12-29 | End: 2023-12-29

## 2023-12-29 RX ORDER — METOPROLOL SUCCINATE 25 MG/1
25 TABLET, EXTENDED RELEASE ORAL DAILY
Status: DISCONTINUED | OUTPATIENT
Start: 2023-12-30 | End: 2023-12-30 | Stop reason: HOSPADM

## 2023-12-29 RX ORDER — SODIUM CHLORIDE 9 MG/ML
INJECTION, SOLUTION INTRAVENOUS PRN
Status: DISCONTINUED | OUTPATIENT
Start: 2023-12-29 | End: 2023-12-30 | Stop reason: HOSPADM

## 2023-12-29 RX ORDER — DIPHENHYDRAMINE HYDROCHLORIDE 50 MG/ML
12.5 INJECTION INTRAMUSCULAR; INTRAVENOUS ONCE
Status: COMPLETED | OUTPATIENT
Start: 2023-12-29 | End: 2023-12-29

## 2023-12-29 RX ORDER — FLUTICASONE PROPIONATE 50 MCG
1 SPRAY, SUSPENSION (ML) NASAL DAILY PRN
Status: DISCONTINUED | OUTPATIENT
Start: 2023-12-29 | End: 2023-12-30 | Stop reason: HOSPADM

## 2023-12-29 RX ORDER — ATORVASTATIN CALCIUM 80 MG/1
80 TABLET, FILM COATED ORAL NIGHTLY
Status: DISCONTINUED | OUTPATIENT
Start: 2023-12-29 | End: 2023-12-30 | Stop reason: HOSPADM

## 2023-12-29 RX ORDER — ASCORBIC ACID 500 MG
500 TABLET ORAL DAILY
Status: DISCONTINUED | OUTPATIENT
Start: 2023-12-30 | End: 2023-12-30 | Stop reason: HOSPADM

## 2023-12-29 RX ORDER — LABETALOL HYDROCHLORIDE 5 MG/ML
10 INJECTION, SOLUTION INTRAVENOUS EVERY 4 HOURS PRN
Status: DISCONTINUED | OUTPATIENT
Start: 2023-12-29 | End: 2023-12-30 | Stop reason: HOSPADM

## 2023-12-29 RX ADMIN — Medication 10 ML: at 22:08

## 2023-12-29 RX ADMIN — PROCHLORPERAZINE EDISYLATE 10 MG: 5 INJECTION INTRAMUSCULAR; INTRAVENOUS at 20:20

## 2023-12-29 RX ADMIN — Medication 10 ML: at 21:47

## 2023-12-29 RX ADMIN — ATORVASTATIN CALCIUM 80 MG: 80 TABLET, FILM COATED ORAL at 22:08

## 2023-12-29 RX ADMIN — DIPHENHYDRAMINE HYDROCHLORIDE 12.5 MG: 50 INJECTION, SOLUTION INTRAMUSCULAR; INTRAVENOUS at 15:43

## 2023-12-29 RX ADMIN — PROCHLORPERAZINE EDISYLATE 10 MG: 5 INJECTION INTRAMUSCULAR; INTRAVENOUS at 15:47

## 2023-12-29 RX ADMIN — SODIUM CHLORIDE 500 ML: 9 INJECTION, SOLUTION INTRAVENOUS at 15:45

## 2023-12-29 RX ADMIN — HYDRALAZINE HYDROCHLORIDE 10 MG: 20 INJECTION INTRAMUSCULAR; INTRAVENOUS at 22:08

## 2023-12-29 ASSESSMENT — PAIN SCALES - GENERAL
PAINLEVEL_OUTOF10: 1
PAINLEVEL_OUTOF10: 6
PAINLEVEL_OUTOF10: 1

## 2023-12-29 ASSESSMENT — LIFESTYLE VARIABLES
HOW OFTEN DO YOU HAVE A DRINK CONTAINING ALCOHOL: 2-4 TIMES A MONTH
HOW MANY STANDARD DRINKS CONTAINING ALCOHOL DO YOU HAVE ON A TYPICAL DAY: 1 OR 2

## 2023-12-29 ASSESSMENT — PAIN DESCRIPTION - PAIN TYPE: TYPE: ACUTE PAIN

## 2023-12-29 ASSESSMENT — PAIN - FUNCTIONAL ASSESSMENT: PAIN_FUNCTIONAL_ASSESSMENT: 0-10

## 2023-12-29 ASSESSMENT — PAIN DESCRIPTION - DESCRIPTORS: DESCRIPTORS: ACHING

## 2023-12-29 ASSESSMENT — PAIN DESCRIPTION - LOCATION: LOCATION: HEAD

## 2023-12-29 NOTE — ED TRIAGE NOTES
Pt into ER from home with c/c Headache onset 2 weeks ago with double vision in both eyes. No Hx of migraines. Symptoms started after having Covid. Pt has seen ENT, PCP, and eye doctor with no Dx.

## 2023-12-29 NOTE — H&P
Labs     12/29/23  1534   TROPHS 9       Urinalysis:      Lab Results   Component Value Date/Time    NITRU Negative 07/22/2022 05:30 PM    WBCUA 4 09/12/2017 09:44 AM    RBCUA 325 09/12/2017 09:44 AM    BLOODU Negative 07/22/2022 05:30 PM    SPECGRAV 1.023 07/22/2022 05:30 PM    GLUCOSEU Negative 07/22/2022 05:30 PM       Radiology:         CT HEAD WO CONTRAST   Final Result   No acute intracranial abnormality. Consults:    IP CONSULT TO NEUROLOGY  IP CONSULT TO NEUROLOGY    ASSESSMENT:    Active Hospital Problems    Diagnosis Date Noted    Diplopia [H53.2] 12/29/2023         PLAN:    Diplopia, no obvious etiology, has seen already ophthalmology, admitted to the hospital as an observation status, MRI ordered, neurology consulted, will start aspirin, DVT prophylaxis starting tomorrow. Will follow on further recommendations from neurology, telemetry monitoring, PT OT  Headache, improved now, MRI ordered, associated with episodes of diplopia, still could be aggravated by elevated blood pressure adding as needed medications  A-fib, apparently not on anticoagulation currently on aspirin echo ordered and will consult cardiology for further recommendations  Essential hypertension, p.o. medications relatively uncontrolled in ED telemetry monitoring  Hyperglycemia blood sugar 120 repeat CMP in a.m. DVT Prophylaxis: Lovenox  Diet: No diet orders on file  Code Status: Prior    PT/OT Eval Status: Ordered    Dispo -observation       Tahira Oliveira MD    Thank you Fabian Salamanca (Inactive) for the opportunity to be involved in this patient's care. If you have any questions or concerns please feel free to contact me at [phone]. Comment: Please note this report has been produced using speech recognition software and may contain errors related to that system including errors in grammar, punctuation, and spelling, as well as words and phrases that may be inappropriate.  If there are any questions or

## 2023-12-29 NOTE — ED PROVIDER NOTES
[facility]     EMERGENCY DEPARTMENT ENCOUNTER            Pt Name: Luis Zambrano   MRN: [id number]   Birthdate 1950   Date of evaluation: 12/29/2023   Provider: Bijan Negrete MD   PCP: Rachid Delaney (Inactive)   Note Started: 2:18 PM EST 12/29/23          CHIEF COMPLAINT     Chief Complaint   Patient presents with    Headache     Headache onset 2 weeks ago with double vision in both eyes.  No Hx of migraines.  Symptoms started after having Covid.  Pt has seen ENT, PCP, and eye doctor with no Dx.               HISTORY OF PRESENT ILLNESS:   History from : Patient   Limitations to history : None     Luis Zambrano is a 73 y.o. male who presents complaining of a 2-week history of binocular diplopia.  The patient states on December 11 he was diagnosed with COVID.  Approximately 4 days later on December 15 he started to notice that when both his eyes were open and he would have diplopia.  He does not persistent.  It will come and go.  He saw ophthalmology, and he also saw ENT.  ENT performed a CT scan of the patient's sinuses and did not appreciate any significant sinusitis.  The diplopia has been associated with a headache this been persistent for the last 2 weeks.  The patient denies any history of trauma.  He is not on anticoagulants.  No history of migraine headache. He currently does NOT have diplopia in the ED    Nursing Notes were all reviewed and agreed with, or any disagreements were addressed in the HPI.     REVIEW OF SYSTEMS :    Review of Systems   Eyes:  Positive for visual disturbance.   Neurological:  Positive for headaches.        MEDICAL HISTORY   has a past medical history of Atrial fibrillation ([facility]), Colon polyp (09/03/2008), COPD (chronic obstructive pulmonary disease) ([facility]), Enlarged prostate, FHx: hyperlipidemia (04/09/1997), GERD (gastroesophageal reflux disease), History of bleeding ulcers (2022), History of echocardiogram (2009), Hypertension, Pacemaker  administration in time range)   labetalol (NORMODYNE;TRANDATE) injection 10 mg (has no administration in time range)   hydrALAZINE (APRESOLINE) injection 10 mg (has no administration in time range)   diphenhydrAMINE (BENADRYL) injection 12.5 mg (12.5 mg IntraVENous Given 12/29/23 1543)   sodium chloride 0.9 % bolus 500 mL (0 mLs IntraVENous Stopped 12/29/23 1639)        CONSULTS: (Who and What was discussed)  IP CONSULT TO NEUROLOGY  IP CONSULT TO NEUROLOGY  IP CONSULT TO CARDIOLOGY    Discussion with Other Profesionals : Admitting Team and Neurology    Social Determinants : None    Records Reviewed : None    Chronic Conditions:   has a past medical history of Atrial fibrillation ([address]), Colon polyp (09/03/2008), COPD (chronic obstructive pulmonary disease) ([address]), Enlarged prostate, FHx: hyperlipidemia (04/09/1997), GERD (gastroesophageal reflux disease), History of bleeding ulcers (2022), History of echocardiogram (2009), Hypertension, Pacemaker (2019), and Wears glasses. Disposition Considerations:    I am the Primary Clinician of Record. FINAL IMPRESSION    1. Binocular vision disorder with diplopia    2. Acute nonintractable headache, unspecified headache type           DISPOSITION/PLAN     PATIENT REFERRED TO:   No follow-up provider specified.      DISCHARGE MEDICATIONS:   Current Discharge Medication List           DISCONTINUED MEDICATIONS:   Current Discharge Medication List        STOP taking these medications       pantoprazole (PROTONIX) 40 MG tablet Comments:   Reason for Stopping:         metoprolol succinate (TOPROL XL) 25 MG extended release tablet Comments:   Reason for Stopping:         cyanocobalamin 1000 MCG/ML injection Comments:   Reason for Stopping:                      (Please note that portions of this note were completed with a voice recognition program.  Efforts were made to edit the dictations but occasionally words are mis-transcribed.)       Sheryl Saint, MD

## 2023-12-30 ENCOUNTER — APPOINTMENT (OUTPATIENT)
Dept: CT IMAGING | Age: 73
End: 2023-12-30
Payer: MEDICARE

## 2023-12-30 VITALS
DIASTOLIC BLOOD PRESSURE: 75 MMHG | TEMPERATURE: 98.2 F | WEIGHT: 223.77 LBS | SYSTOLIC BLOOD PRESSURE: 176 MMHG | HEIGHT: 69 IN | OXYGEN SATURATION: 94 % | BODY MASS INDEX: 33.14 KG/M2 | HEART RATE: 73 BPM | RESPIRATION RATE: 18 BRPM

## 2023-12-30 LAB
CHOLEST SERPL-MCNC: 173 MG/DL (ref 0–199)
DEPRECATED RDW RBC AUTO: 13.8 % (ref 12.4–15.4)
EST. AVERAGE GLUCOSE BLD GHB EST-MCNC: 99.7 MG/DL
HBA1C MFR BLD: 5.1 %
HCT VFR BLD AUTO: 44.9 % (ref 40.5–52.5)
HDLC SERPL-MCNC: 49 MG/DL (ref 40–60)
HGB BLD-MCNC: 15.2 G/DL (ref 13.5–17.5)
LDLC SERPL CALC-MCNC: 99 MG/DL
MCH RBC QN AUTO: 29.2 PG (ref 26–34)
MCHC RBC AUTO-ENTMCNC: 33.9 G/DL (ref 31–36)
MCV RBC AUTO: 86.1 FL (ref 80–100)
PLATELET # BLD AUTO: 156 K/UL (ref 135–450)
PMV BLD AUTO: 8.4 FL (ref 5–10.5)
RBC # BLD AUTO: 5.22 M/UL (ref 4.2–5.9)
TRIGL SERPL-MCNC: 123 MG/DL (ref 0–150)
VLDLC SERPL CALC-MCNC: 25 MG/DL
WBC # BLD AUTO: 6.1 K/UL (ref 4–11)

## 2023-12-30 PROCEDURE — 6370000000 HC RX 637 (ALT 250 FOR IP): Performed by: INTERNAL MEDICINE

## 2023-12-30 PROCEDURE — 2580000003 HC RX 258: Performed by: INTERNAL MEDICINE

## 2023-12-30 PROCEDURE — 94760 N-INVAS EAR/PLS OXIMETRY 1: CPT

## 2023-12-30 PROCEDURE — 6360000002 HC RX W HCPCS: Performed by: INTERNAL MEDICINE

## 2023-12-30 PROCEDURE — 96375 TX/PRO/DX INJ NEW DRUG ADDON: CPT

## 2023-12-30 PROCEDURE — 6360000004 HC RX CONTRAST MEDICATION: Performed by: STUDENT IN AN ORGANIZED HEALTH CARE EDUCATION/TRAINING PROGRAM

## 2023-12-30 PROCEDURE — 83036 HEMOGLOBIN GLYCOSYLATED A1C: CPT

## 2023-12-30 PROCEDURE — 83516 IMMUNOASSAY NONANTIBODY: CPT

## 2023-12-30 PROCEDURE — 70450 CT HEAD/BRAIN W/O DYE: CPT

## 2023-12-30 PROCEDURE — 70498 CT ANGIOGRAPHY NECK: CPT

## 2023-12-30 PROCEDURE — G0378 HOSPITAL OBSERVATION PER HR: HCPCS

## 2023-12-30 PROCEDURE — 85027 COMPLETE CBC AUTOMATED: CPT

## 2023-12-30 PROCEDURE — 80061 LIPID PANEL: CPT

## 2023-12-30 PROCEDURE — 83519 RIA NONANTIBODY: CPT

## 2023-12-30 PROCEDURE — 36415 COLL VENOUS BLD VENIPUNCTURE: CPT

## 2023-12-30 PROCEDURE — 94640 AIRWAY INHALATION TREATMENT: CPT

## 2023-12-30 PROCEDURE — 99222 1ST HOSP IP/OBS MODERATE 55: CPT | Performed by: INTERNAL MEDICINE

## 2023-12-30 RX ORDER — ATORVASTATIN CALCIUM 40 MG/1
40 TABLET, FILM COATED ORAL NIGHTLY
Qty: 30 TABLET | Refills: 2 | Status: SHIPPED | OUTPATIENT
Start: 2023-12-30

## 2023-12-30 RX ORDER — ASPIRIN 81 MG/1
81 TABLET, CHEWABLE ORAL DAILY
Status: DISCONTINUED | OUTPATIENT
Start: 2023-12-30 | End: 2023-12-30 | Stop reason: HOSPADM

## 2023-12-30 RX ORDER — ACETAMINOPHEN 325 MG/1
650 TABLET ORAL EVERY 4 HOURS PRN
Status: DISCONTINUED | OUTPATIENT
Start: 2023-12-30 | End: 2023-12-30 | Stop reason: HOSPADM

## 2023-12-30 RX ORDER — CYANOCOBALAMIN 1000 UG/ML
1000 INJECTION, SOLUTION INTRAMUSCULAR; SUBCUTANEOUS ONCE
Qty: 1 ML | Refills: 0 | Status: SHIPPED | OUTPATIENT
Start: 2023-12-30 | End: 2023-12-30

## 2023-12-30 RX ORDER — LORAZEPAM 2 MG/ML
0.5 INJECTION INTRAMUSCULAR ONCE
Status: DISCONTINUED | OUTPATIENT
Start: 2023-12-30 | End: 2023-12-30 | Stop reason: HOSPADM

## 2023-12-30 RX ORDER — METOPROLOL SUCCINATE 25 MG/1
25 TABLET, EXTENDED RELEASE ORAL DAILY
Qty: 30 TABLET | Refills: 3 | Status: SHIPPED | OUTPATIENT
Start: 2023-12-31 | End: 2024-01-04 | Stop reason: DRUGHIGH

## 2023-12-30 RX ADMIN — OXYCODONE HYDROCHLORIDE AND ACETAMINOPHEN 500 MG: 500 TABLET ORAL at 09:08

## 2023-12-30 RX ADMIN — ASPIRIN 81 MG: 81 TABLET, CHEWABLE ORAL at 10:44

## 2023-12-30 RX ADMIN — PANTOPRAZOLE SODIUM 40 MG: 40 TABLET, DELAYED RELEASE ORAL at 06:28

## 2023-12-30 RX ADMIN — IOPAMIDOL 75 ML: 755 INJECTION, SOLUTION INTRAVENOUS at 10:32

## 2023-12-30 RX ADMIN — PANTOPRAZOLE SODIUM 40 MG: 40 TABLET, DELAYED RELEASE ORAL at 14:34

## 2023-12-30 RX ADMIN — Medication 10 ML: at 03:32

## 2023-12-30 RX ADMIN — MOMETASONE FUROATE AND FORMOTEROL FUMARATE DIHYDRATE 2 PUFF: 100; 5 AEROSOL RESPIRATORY (INHALATION) at 09:46

## 2023-12-30 RX ADMIN — LISINOPRIL 40 MG: 40 TABLET ORAL at 09:07

## 2023-12-30 RX ADMIN — Medication 10 ML: at 09:10

## 2023-12-30 RX ADMIN — METOPROLOL SUCCINATE 25 MG: 25 TABLET, EXTENDED RELEASE ORAL at 09:07

## 2023-12-30 RX ADMIN — APIXABAN 5 MG: 5 TABLET, FILM COATED ORAL at 09:07

## 2023-12-30 RX ADMIN — LABETALOL HYDROCHLORIDE 10 MG: 5 INJECTION INTRAVENOUS at 03:30

## 2023-12-30 RX ADMIN — ACETAMINOPHEN 650 MG: 325 TABLET ORAL at 11:59

## 2023-12-30 ASSESSMENT — PAIN SCALES - GENERAL
PAINLEVEL_OUTOF10: 1
PAINLEVEL_OUTOF10: 6

## 2023-12-30 ASSESSMENT — PAIN DESCRIPTION - LOCATION: LOCATION: HEAD

## 2023-12-30 NOTE — CARE COORDINATION
Discharge Planning:      (CM) reviewed the patient's chart to assess needs. Patient's Readmission Risk Score is not noted as pt is in observation status . Patient's medical insurance is  Payor: Viral Alvarez / Plan: [employer] [place] PPO / Product Type: Medicare / .  Patient's PCP is Norah Aburto (Inactive) . No needs anticipated, at this time. CM team to follow. Staff to inform CM if additional discharge needs arise. Pts preferred pharmacy is   [facility] Lee [address] [place] [address] [phone] [facility] [phone]  [address] [place] [address]  Phone: [phone] Fax: [phone]    Please consult SW/CM if a d/c need should arise.    JASEN Germain [facility]  [phone]  Electronically signed by JASEN Mahmood on 12/30/2023 at 4:02 PM

## 2023-12-30 NOTE — PROGRESS NOTES
Pt arrived to floor via stretcher from ED and ambulated to bed. Bedside monitor activated. Patient oriented to room and use of call light. Call light and personal items within reach. Admission and assessment initiated. Education initiated and reviewed with patient. Denied further needs or questions at this time.

## 2023-12-30 NOTE — PROGRESS NOTES
4 Eyes Skin Assessment     NAME:  Amee Morrison  YOB: 1950  MEDICAL RECORD NUMBER:  [id number]    The patient is being assessed for  Admission    I agree that at least one RN has performed a thorough Head to Toe Skin Assessment on the patient. ALL assessment sites listed below have been assessed. Areas assessed by both nurses:    Head, Face, Ears, Shoulders, Back, Chest, Arms, Elbows, Hands, Sacrum. Buttock, Coccyx, Ischium, Legs. Feet and Heels, and Under Medical Devices         Does the Patient have a Wound?  No noted wound(s)       Erik Prevention initiated by RN: Yes  Wound Care Orders initiated by RN: No    Pressure Injury (Stage 3,4, Unstageable, DTI, NWPT, and Complex wounds) if present, place Wound referral order by RN under : No    New Ostomies, if present place, Ostomy referral order under : No     Nurse 1 eSignature: Electronically signed by Claudia Correa RN on 12/29/23 at 11:30 PM EST    **SHARE this note so that the co-signing nurse can place an eSignature**    Nurse 2 eSignature: Electronically signed by Hayden Palma RN on 12/30/23 at 12:15 AM EST

## 2023-12-30 NOTE — PROGRESS NOTES
Medication Reconciliation    List of medications patient is currently taking is complete. Source of information: 1. Conversation with patient at bedside                                      2. EPIC records      Allergies  Patient has no known allergies. Notes regarding home medications:   1. Patient received all of his morning home medications prior to arrival to the emergency department.     Gely Corrales, Pharmacy Intern  12/29/2023 7:02 PM

## 2023-12-30 NOTE — DISCHARGE SUMMARY
Hospital Medicine Discharge Summary    Cornelia Sanchez  :  1950  MRN:  [id number]    Admit date:  2023  Discharge date:  2023    Admitting Physician:  Thor Trinidad MD  Primary Care Physician:  Aniya Lemos (Inactive)  Code Status:   Full Code  Status: Observation  Discharged Condition: Stable  Activity: activity as tolerated  Diet:  ADULT DIET; Regular      Discharge Diagnoses:      Diplopia    Headache    Atrial fibrillation    Primary hypertension    Gastric reflux    Hyperglycemia    Heart block s/p pacemaker (Aug 2018)    Hospital Course:   74 y.o. male admitted with two week history of diplopia, denies any focal weakness or sensory deficit. Double vision since diagnosed with COVID-19 (Dec 11) and has been seen by ophthalmology and otolaryngology as outpatient. ENT performed a CT scan of the patient's sinuses and did not appreciate any significant sinusitis. Headache on admission improved. Has pacemaker that will need to be turned off to complete MRI brain. Also will need open MRI and sedation. Prefers to have open MRI as outpatient if needed. Diplopia,no obvious etiology. Double vision since diagnosed with COVID-19 (Dec 11) and has been seen by ophthalmology as outpatient. Aspirin 81 mg and high intensity statin therapy with atorvastatin 80 mg daily. Headache on admission improved with acetaminophen. Atrial fibrillation:  Anticoagulation with apixaban 5 mg BID. [id number] score of at least 4 per EP. Rate control with metoprolol succinate 25 mg daily. Primary hypertension on lisinopril 40 mg daily and metoprolol succinate as above. Gastric reflux and stress ulcer prophylaxis with pantoprazole (Protonix) 40 mg daily. Hyperglycemia with HgbA1c normal.  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   Device interrogation within the last month showed many episodes (> 10) of sustained atrial fibrillation and atrial flutter, some

## 2023-12-30 NOTE — PROGRESS NOTES
One time dose of ativan ordered to complete MRI. MRI tech states pt cannot be medicated for MRI due to pacemaker. Pt refusing to complete MRI without medication. MD notified.

## 2023-12-30 NOTE — CONSULTS
upper and lower facial symmetric without dysarthria  CN8: hearing grossly intact to conversation.  CN9/10: palate elevated symmetrically  CN11: trap full strength on shoulder shrug bilaterally, SCM without weakness.  CN12: tongue midline with protrusion. Able to move tongue side to side.   Strength: Grasp: BUE 5 .   Intrinsic hand muscle strength: Finger Flexion:5 bilaterally. Finger Extension: 5 bilaterally.   RUE: 5 Shoulder abduction, elbow flexion, elbow extension.  LUE: 5  Shoulder abduction, elbow flexion, elbow extension.    Dorsiflexion: R 5, L 5 ;  Plantar flexion: R 5, L 5  RLE: 5/5 Hip flexion, Knee flexion, Knee extension. LLE: 5/5 Hip flexion, Knee flexion, Knee extension.   Deep tendon reflexes:   R Bicep: 2  R Brachioradialis: 2  R Patellar: 2   R Babinski: Toes 2  L Bicep 2 L Brachioradialis:: 2  L Patellar: 2    L Babinski: Toes   Sensory: light touch intact in all 4 extremities.   pinprick sensation intact in all extremities. No sensory extinction on double simultaneous stimulation  Cerebellar/coordination: finger nose finger normal without ataxia  Tone: normal in all 4 extremities  Gait: normal gait    Labs  Lab Results   Component Value Date    WBC 6.1 12/30/2023    HGB 15.2 12/30/2023    HCT 44.9 12/30/2023    MCV 86.1 12/30/2023     12/30/2023       Lab Results   Component Value Date/Time     12/29/2023 03:34 PM    K 4.1 12/29/2023 03:34 PM     12/29/2023 03:34 PM    CO2 31 12/29/2023 03:34 PM    BUN 15 12/29/2023 03:34 PM    CREATININE 0.7 12/29/2023 03:34 PM    GLUCOSE 122 12/29/2023 03:34 PM    CALCIUM 10.0 12/29/2023 03:34 PM    LABGLOM >60 12/29/2023 03:34 PM        UA:    Studies  CT Head w/o:  IMPRESSION:  No acute intracranial abnormality.         Impression  #Double vision  #Atrial fibrillation    Acute onset vertical diplopia following COVID infection, history notable for af not on AC. Symptoms resolved two days ago. Exam currently normal but with mild ptosis left  eye.    Given history I am most suspicious of a vascular event (small stroke or vascular partial 3rd cranial neuropathy). From that standpoint given his atrial fibrillation would recommend consideration of AC. Ideally would obtain MRI to further evaluate, but may not be possible with his PM.    Other considerations would be a cerebral aneurysm compressing CNIII or myasthenia gravis. Will order CTA head to further evaluate and also myasthenia panel.     Plan:  - recommend consideration for [facility] given atrial fibrillation and suspicion for a vascular etiology, will defer to cardiology  - ideally would get MRI brain if able  - CTA head ordered   - AchR antibodies ordered  - eventual follow-up with me in 4-6 weeks    Violet Duarte MD  [facility]    A copy of this note was provided for Dr Lisette Garcia MD

## 2023-12-30 NOTE — CONSULTS
Cardiac Electrophysiology Consultation   Date: 12/30/2023  Admit Date:  12/29/2023  Reason for Consultation: evaluation for Afib in the setting of new diplopia  Consult Requesting Physician: Jamarcus Valle MD     Chief Complaint   Patient presents with    Headache     Headache onset 2 weeks ago with double vision in both eyes. No Hx of migraines. Symptoms started after having Covid. Pt has seen ENT, PCP, and eye doctor with no Dx. HPI: Therese Clark is a 74 y.o. M h/o frequent 3rd degree CHB s/p Medtronic 2-ch PPM implantation Aug 2018 presents with diplopia x 2 weeks and HA. Upon workup, CT head unremarkable for CVA. Calvin unremarkable. Device interrogation within the last month showed many episodes (> 10) of sustained atrial fibrillation and atrial flutter, some lasting 13-14 hours at a time. V-rates and rhythm during these episodes are v-paced at 60's bpm. Was started on ASA. Past Medical History:   Diagnosis Date    Atrial fibrillation ([address])     Colon polyp 09/03/2008    COPD (chronic obstructive pulmonary disease) ([address])     Enlarged prostate     FHx: hyperlipidemia 04/09/1997    GERD (gastroesophageal reflux disease)     History of bleeding ulcers 2022    History of echocardiogram 2009    Hypertension     Pacemaker 2019    Medtronic    Wears glasses         Past Surgical History:   Procedure Laterality Date    CARDIAC CATHETERIZATION      COLONOSCOPY      colon polyp    COLONOSCOPY N/A 10/3/2022    COLONOSCOPY POLYPECTOMY SNARE/COLD BIOPSY performed by Vivian Beard MD at [address] [place] [address]  2016    per patient     TURP  06/29/2016    with cysto    UPPER GASTROINTESTINAL ENDOSCOPY N/A 07/24/2022    EGD BIOPSY performed by Barbie Collins MD at [facility] N/A 10/3/2022    ESOPHAGOGASTRODUODENOSCOPY performed by Vivian Beard MD at [address] [place] [address]       No Known Allergies    Social History:  Reviewed.   reports that he has

## 2023-12-30 NOTE — PLAN OF CARE
Problem: Discharge Planning  Goal: Discharge to home or other facility with appropriate resources  Outcome: Progressing  Flowsheets (Taken 12/29/2023 2130)  Discharge to home or other facility with appropriate resources:   Identify barriers to discharge with patient and caregiver   Arrange for needed discharge resources and transportation as appropriate   Identify discharge learning needs (meds, wound care, etc)   Refer to discharge planning if patient needs post-hospital services based on physician order or complex needs related to functional status, cognitive ability or social support system     Problem: Pain  Goal: Verbalizes/displays adequate comfort level or baseline comfort level  Outcome: Progressing  Flowsheets (Taken 12/29/2023 2130)  Verbalizes/displays adequate comfort level or baseline comfort level:   Encourage patient to monitor pain and request assistance   Assess pain using appropriate pain scale   Administer analgesics based on type and severity of pain and evaluate response   Implement non-pharmacological measures as appropriate and evaluate response   Notify Licensed Independent Practitioner if interventions unsuccessful or patient reports new pain     Problem: ABCDS Injury Assessment  Goal: Absence of physical injury  Outcome: Progressing  Flowsheets (Taken 12/29/2023 2223)  Absence of Physical Injury: Implement safety measures based on patient assessment     Problem: Safety - Adult  Goal: Free from fall injury  Outcome: Progressing

## 2023-12-30 NOTE — PROGRESS NOTES
At 2208 hr patient's BP was 191/104 cycled three times and his DBP >100. IV hydralazine 10 mg PRN given. At 0330 hr patient's BP was 189/98 recycled a couple of times,his ,223 on different times. So IV Labetalol 10 mg PRN administered with saline flush. Patient is asleep without any headache.

## 2023-12-30 NOTE — PLAN OF CARE
Patient discharged home per MD order. Patient verbalizes understanding of all discharge instructions. IV removed intact.  All belongings with patient at discharge,  Problem: Discharge Planning  Goal: Discharge to home or other facility with appropriate resources  Outcome: Adequate for Discharge     Problem: Pain  Goal: Verbalizes/displays adequate comfort level or baseline comfort level  Outcome: Adequate for Discharge     Problem: ABCDS Injury Assessment  Goal: Absence of physical injury  Outcome: Adequate for Discharge     Problem: Safety - Adult  Goal: Free from fall injury  Outcome: Adequate for Discharge

## 2023-12-31 LAB
ACHR BIND AB SER-SCNC: 0 NMOL/L (ref 0–0.4)
ACHR BLOCK AB/ACHR TOTAL SFR SER: 10 % (ref 0–26)

## 2024-01-03 ENCOUNTER — TELEPHONE (OUTPATIENT)
Dept: CARDIOLOGY CLINIC | Age: 74
End: 2024-01-03

## 2024-01-03 NOTE — TELEPHONE ENCOUNTER
Luis called in this afternoon, he states he saw Dr. De La Rosa in the hospital last weekend and he told him to follow up with him in a few weeks. Dr. De La Rosa doesn't have any openings until March. Please advise.      He can be reached at [phone].

## 2024-01-04 ENCOUNTER — TELEPHONE (OUTPATIENT)
Dept: CARDIOLOGY CLINIC | Age: 74
End: 2024-01-04

## 2024-01-04 RX ORDER — METOPROLOL SUCCINATE 25 MG/1
50 TABLET, EXTENDED RELEASE ORAL DAILY
Qty: 30 TABLET | Refills: 3 | COMMUNITY
Start: 2024-01-04

## 2024-01-04 NOTE — TELEPHONE ENCOUNTER
PT asked if he could try a different blood pressure medicine. He states his BP has been in the high 100/90's.     Please advise.    Luis can be reached at [phone]

## 2024-01-04 NOTE — TELEPHONE ENCOUNTER
Spoke with patient he stated his BP has been elevated 176/987 192/108. He spoke with his PCP and he increased his Metoprolol succinate to 50 mg daily.    I advised that Dr. De La Rosa would defer to his PCP for management of his BP but that if BP remains elevated they may want to consider an increase in Lisinopril or Cardura. Verbalized understanding.

## 2024-01-23 ENCOUNTER — NURSE ONLY (OUTPATIENT)
Dept: CARDIOLOGY CLINIC | Age: 74
End: 2024-01-23
Payer: MEDICARE

## 2024-01-23 ENCOUNTER — TELEPHONE (OUTPATIENT)
Dept: CARDIOLOGY CLINIC | Age: 74
End: 2024-01-23

## 2024-01-23 ENCOUNTER — OFFICE VISIT (OUTPATIENT)
Dept: CARDIOLOGY CLINIC | Age: 74
End: 2024-01-23
Payer: MEDICARE

## 2024-01-23 VITALS
WEIGHT: 231.8 LBS | HEART RATE: 65 BPM | OXYGEN SATURATION: 96 % | HEIGHT: 69 IN | DIASTOLIC BLOOD PRESSURE: 70 MMHG | BODY MASS INDEX: 34.33 KG/M2 | SYSTOLIC BLOOD PRESSURE: 140 MMHG

## 2024-01-23 DIAGNOSIS — I44.7 LBBB (LEFT BUNDLE BRANCH BLOCK): ICD-10-CM

## 2024-01-23 DIAGNOSIS — Z95.0 PACEMAKER: ICD-10-CM

## 2024-01-23 DIAGNOSIS — Z79.01 CURRENT USE OF ANTICOAGULANT THERAPY: ICD-10-CM

## 2024-01-23 DIAGNOSIS — I42.9 CARDIOMYOPATHY, UNSPECIFIED TYPE (HCC): ICD-10-CM

## 2024-01-23 DIAGNOSIS — Z95.0 PACEMAKER: Primary | ICD-10-CM

## 2024-01-23 DIAGNOSIS — I44.2 CHB (COMPLETE HEART BLOCK) (HCC): ICD-10-CM

## 2024-01-23 DIAGNOSIS — R55 SYNCOPE, UNSPECIFIED SYNCOPE TYPE: ICD-10-CM

## 2024-01-23 DIAGNOSIS — I48.0 PAROXYSMAL ATRIAL FIBRILLATION (HCC): Primary | ICD-10-CM

## 2024-01-23 PROCEDURE — 93280 PM DEVICE PROGR EVAL DUAL: CPT | Performed by: INTERNAL MEDICINE

## 2024-01-23 PROCEDURE — 3077F SYST BP >= 140 MM HG: CPT | Performed by: INTERNAL MEDICINE

## 2024-01-23 PROCEDURE — 99215 OFFICE O/P EST HI 40 MIN: CPT | Performed by: INTERNAL MEDICINE

## 2024-01-23 PROCEDURE — 3078F DIAST BP <80 MM HG: CPT | Performed by: INTERNAL MEDICINE

## 2024-01-23 PROCEDURE — 1123F ACP DISCUSS/DSCN MKR DOCD: CPT | Performed by: INTERNAL MEDICINE

## 2024-01-23 RX ORDER — PANTOPRAZOLE SODIUM 40 MG/1
TABLET, DELAYED RELEASE ORAL
COMMUNITY
Start: 2024-01-02

## 2024-01-23 NOTE — PATIENT INSTRUCTIONS
practitioner post procedure                                        [facility]     [facility]  [address]  [address]   [place] [address]  Phone: [phone]  The hours are Mon -Fri.  6:30 am - 4:00 pm   Saturday 8:00 am - noon  No appointment necessary

## 2024-01-23 NOTE — TELEPHONE ENCOUNTER
Please reach out to patient and schedule DON atrial fibrillation ablation with Dr. De La Rosa at [facility].     Anesthesia is needed   Echo tech is NOT needed for DON.  Include Carto Rep in email [email or web address]      CTA Pre procedure instructions      As part of your pre procedure work up you will need to get a CT scan of your heart. This scan is completed in order to give Dr. De La Rosa a map of the atrium (chamber of your heart) where he will be doing the ablation.    Date:_________________________________    Time:_________________________________    -Arrive 20 minutes prior to scheduled testing time  -Nothing to eat or drink for at least 4 hours prior to test  -No caffeine for 24 hours prior to the CTA scan.    Atrial Fibrillation Ablation Pre procedure Instructions    Date:______________________________    Arrive at:__________________________    Procedure time:____________________    The morning of your procedure you will park in the hospital parking lot and report directly to the cath lab to check in.   At the information desk stay right and go all the way to the end of the king, this will take you directly to your check in desk for the cath lab.    Pre-Procedure Instructions   You will need to fast (nothing to eat or drink) after midnight the day of your procedure.  Do NOT chew gum or eat mints the day of your procedure.  You will need to hold your Eliquis for 12 hours prior to the procedure.  You will need to hold your Aspirin for 7 days prior to the procedure.  All other medications may be taken with a sip of water at your regularly scheduled times.   Do not use any lotions, creams or perfume the morning of procedure.   You will need to complete pre-procedure lab work 5-7 days prior to your procedure.  Please have a responsible adult to drive you home after procedure, you should go home same day, but there is always a possibility of an overnight stay.  Cath lab will provide you with all post procedure

## 2024-01-23 NOTE — TELEPHONE ENCOUNTER
Spoke w/pt scheduled they verbalized understanding instructions below.        CTA Pre procedure instructions       As part of your pre procedure work up you will need to get a CT scan of your heart. This scan is completed in order to give Dr. De La Rosa a map of the atrium (chamber of your heart) where he will be doing the ablation.     Date: 1/25/24     Time: 10:00 am, arrive by 9:30 am     -Arrive 20 minutes prior to scheduled testing time  -Nothing to eat or drink for at least 4 hours prior to test  -No caffeine for 12 hours prior to the CTA scan.     Atrial Fibrillation Ablation Pre procedure Instructions     Date: 1/29/24    Arrive at:  6:30 am   Procedure time:8:00 am     The morning of your procedure you will park in the hospital parking lot and report directly to the cath lab to check in.   At the information desk stay right and go all the way to the end of the king, this will take you directly to your check in desk for the cath lab.     Pre-Procedure Instructions   You will need to fast (nothing to eat or drink) after midnight the day of your procedure.  Do NOT chew gum or eat mints the day of your procedure.  You will need to hold your Eliquis for 12 hours prior to the procedure.  You will need to hold your Aspirin for 7 days prior to the procedure.  All other medications may be taken with a sip of water at your regularly scheduled times.   Do not use any lotions, creams or perfume the morning of procedure.   You will need to complete pre-procedure lab work 5-7 days prior to your procedure.  Please have a responsible adult to drive you home after procedure, you should go home same day, but there is always a possibility of an overnight stay.  Cath lab will provide you with all post procedure instructions  A 3 month follow up will be scheduled with Dr. De La Rosa's Nurse practitioner post procedure                                          [facility]     [facility]  [address]

## 2024-01-23 NOTE — PROGRESS NOTES
Cardiac Electrophysiology Consultation   Date: 1/23/2024   Reason for Consultation: Paroxysmal atrial fibrillation  Consult Requesting Physician: Rachid Delaney (Inactive)   Primary Care Physician: Rachid Delaney MD    Chief Complaint:   Chief Complaint   Patient presents with    Follow-Up from Hospital    Device Check       HPI: Luis Zambrano is a 73 y.o. patient with a history of paroxymal atrial fibrillation, essential hypertension, hyperlipidemia,high grade AV block s/p implantation of dual chamber pacemaker  syncope,  left bundle branch block and upper gastrointestinal bleeding.  He was hospitalized at [facility] from 7/22/2022 to 7/24/2022 for upper GI bleed. He underwent esophagogastroduodenoscopy with biopsy on 7/24/2022 which showed 2 small clean based gastric ulcers measuring 6-8mm in size, located in the gastric antrum, just proximal to the pyloric channel with no high risk stigmata. Mild gastric erythema in the gastric body, greater curvature. Non obstructive Schatzki ring at the GE junction. A 1 cm hiatal hernia and mild duodenitis in the duodenal bulb. He is not on OAC and was previously followed by Karthik Aguilar MD and had discussions regarding  on OAC but he declined. He was seen in office by Karthik Aguilar MD on 09/12/2023 and reported worsening shortness of breath on exertion. NM stress test and Echo were ordered but patient did not complete.    Interval History: Today, he presents to the office with his wife and daughter for hospital follow up to discuss treatment options for atrial fibrillation. EKG today shows ventricular paced with v-rate of 60 bpm.  He is compliant with his medications and tolerating them well. He denies chest pain/pressure, tightness, edema, shortness of breath, heart racing, palpitations, lightheadedness, dizziness, syncope, presyncope,  PND or orthopnea.          Past Medical History:   Diagnosis Date    Atrial fibrillation (HCC)     Colon polyp 
mitral and tricuspid regurgitation.  LA volume/Index: 59.67 ml /27 ml/m2     3. Stress Test:  12/2012  Normal myocardial perfusion scan with no evidence of myocardial ischemia or infarct.    4. Cath:     I independently reviewed the ECG, MCOT, echocardiogram, stress test, and coronary angiography/PCI results and used them for my plan of care.      EP Procedures:  Electrophysiology study 08/01/2018  Baseline intervals:  ms, ,  ms, . The AH was 110 ms and HV ~ 100 ms.  Atrial pacing between 550 and 500 ms resulted in intra his block intermittently.  AV wenckebach at 450 ms   Assessment: intra his block with prolonged HV and LBBB.  2. Implantation of Medtronic dual chamber pacemaker 08/01/2018    Assessment/Plan:     Paroxysmal atrial fibrillation    - Continue Toprol XL 25 mg daily for rate control.  - Patient has a [id number] Score 3 (HTN, AGE)  - According to the AHA/ACC/HRS guidelines, with a [id number] score of 3, the patient is indicated for OAC at this time.    - I discussed oral anticoagulation to decrease the risk of thromboembolic events including stroke. Benefits and alternatives were discussed with patient. Risk of bleeding was discussed. Patient verbalized understanding. Different forms of anticoagulants including warfarin (Coumadin), Dabigatran (Pradaxa), Rivaroxaban (Xarelto), Apixaban (Eliquis), and Edoxaban (Saveysa) were discussed.   Patient opted to start with ***.    Complete heart block  - symptomatic with syncope prior to implantation.  -s/p EPS with paroxysmal AV block with LBBB.  - s/p implantation of dual chamber pacemaker 08/01/2018.    Pacemaker  Device interrogated today and based on threshold, impedance, and intrinsic sensing tests run today, device appears to functioning with minimal deviation from established trends.  Estimated battery longevity ***.  AP *** %   *** %        Thank you for allowing me to participate in the care of Luis Zambrano. All

## 2024-01-25 ENCOUNTER — HOSPITAL ENCOUNTER (OUTPATIENT)
Age: 74
Discharge: HOME | End: 2024-01-25
Attending: INTERNAL MEDICINE
Payer: MEDICARE

## 2024-01-25 ENCOUNTER — HOSPITAL ENCOUNTER (OUTPATIENT)
Dept: CT IMAGING | Age: 74
Discharge: HOME | End: 2024-01-25
Attending: INTERNAL MEDICINE
Payer: MEDICARE

## 2024-01-25 DIAGNOSIS — I48.0 PAROXYSMAL ATRIAL FIBRILLATION (HCC): ICD-10-CM

## 2024-01-25 LAB
ABO + RH BLD: NORMAL
ANION GAP SERPL CALCULATED.3IONS-SCNC: 10 MMOL/L (ref 3–16)
BLD GP AB SCN SERPL QL: NORMAL
BUN SERPL-MCNC: 17 MG/DL (ref 7–20)
CALCIUM SERPL-MCNC: 9.5 MG/DL (ref 8.3–10.6)
CHLORIDE SERPL-SCNC: 105 MMOL/L (ref 99–110)
CO2 SERPL-SCNC: 29 MMOL/L (ref 21–32)
CREAT SERPL-MCNC: 0.8 MG/DL (ref 0.8–1.3)
DEPRECATED RDW RBC AUTO: 13.6 % (ref 12.4–15.4)
GFR SERPLBLD CREATININE-BSD FMLA CKD-EPI: >60 ML/MIN/{1.73_M2}
GLUCOSE SERPL-MCNC: 108 MG/DL (ref 70–99)
HCT VFR BLD AUTO: 46.3 % (ref 40.5–52.5)
HGB BLD-MCNC: 15.8 G/DL (ref 13.5–17.5)
MCH RBC QN AUTO: 29.3 PG (ref 26–34)
MCHC RBC AUTO-ENTMCNC: 34.2 G/DL (ref 31–36)
MCV RBC AUTO: 85.7 FL (ref 80–100)
PLATELET # BLD AUTO: 136 K/UL (ref 135–450)
PMV BLD AUTO: 8.4 FL (ref 5–10.5)
POTASSIUM SERPL-SCNC: 4.1 MMOL/L (ref 3.5–5.1)
RBC # BLD AUTO: 5.41 M/UL (ref 4.2–5.9)
SODIUM SERPL-SCNC: 144 MMOL/L (ref 136–145)
WBC # BLD AUTO: 4.7 K/UL (ref 4–11)

## 2024-01-25 PROCEDURE — 86900 BLOOD TYPING SEROLOGIC ABO: CPT

## 2024-01-25 PROCEDURE — 36415 COLL VENOUS BLD VENIPUNCTURE: CPT

## 2024-01-25 PROCEDURE — 80048 BASIC METABOLIC PNL TOTAL CA: CPT

## 2024-01-25 PROCEDURE — 6360000004 HC RX CONTRAST MEDICATION: Performed by: INTERNAL MEDICINE

## 2024-01-25 PROCEDURE — 85027 COMPLETE CBC AUTOMATED: CPT

## 2024-01-25 PROCEDURE — 86850 RBC ANTIBODY SCREEN: CPT

## 2024-01-25 PROCEDURE — 75574 CT ANGIO HRT W/3D IMAGE: CPT

## 2024-01-25 PROCEDURE — 86901 BLOOD TYPING SEROLOGIC RH(D): CPT

## 2024-01-25 RX ADMIN — IOPAMIDOL 75 ML: 755 INJECTION, SOLUTION INTRAVENOUS at 09:47

## 2024-01-29 ENCOUNTER — ANESTHESIA EVENT (OUTPATIENT)
Dept: CARDIAC CATH/INVASIVE PROCEDURES | Age: 74
End: 2024-01-29
Payer: MEDICARE

## 2024-01-29 ENCOUNTER — TELEPHONE (OUTPATIENT)
Dept: CARDIOLOGY CLINIC | Age: 74
End: 2024-01-29

## 2024-01-29 ENCOUNTER — HOSPITAL ENCOUNTER (OUTPATIENT)
Dept: CARDIAC CATH/INVASIVE PROCEDURES | Age: 74
Discharge: HOME | End: 2024-01-29
Payer: MEDICARE

## 2024-01-29 ENCOUNTER — ANESTHESIA (OUTPATIENT)
Dept: CARDIAC CATH/INVASIVE PROCEDURES | Age: 74
End: 2024-01-29
Payer: MEDICARE

## 2024-01-29 VITALS
BODY MASS INDEX: 33.92 KG/M2 | DIASTOLIC BLOOD PRESSURE: 68 MMHG | RESPIRATION RATE: 11 BRPM | TEMPERATURE: 96.5 F | HEART RATE: 61 BPM | HEIGHT: 69 IN | SYSTOLIC BLOOD PRESSURE: 135 MMHG | WEIGHT: 229 LBS | OXYGEN SATURATION: 91 %

## 2024-01-29 LAB
ABO + RH BLD: NORMAL
BLD GP AB SCN SERPL QL: NORMAL
POC ACT LR: 348 SEC
POC ACT LR: 349 SEC

## 2024-01-29 PROCEDURE — C1730 CATH, EP, 19 OR FEW ELECT: HCPCS | Performed by: INTERNAL MEDICINE

## 2024-01-29 PROCEDURE — 2500000003 HC RX 250 WO HCPCS: Performed by: NURSE ANESTHETIST, CERTIFIED REGISTERED

## 2024-01-29 PROCEDURE — 93622 COMP EP EVAL L VENTR PAC&REC: CPT | Performed by: INTERNAL MEDICINE

## 2024-01-29 PROCEDURE — 3700000000 HC ANESTHESIA ATTENDED CARE: Performed by: ANESTHESIOLOGY

## 2024-01-29 PROCEDURE — C1732 CATH, EP, DIAG/ABL, 3D/VECT: HCPCS | Performed by: INTERNAL MEDICINE

## 2024-01-29 PROCEDURE — 93655 ICAR CATH ABLTJ DSCRT ARRHYT: CPT

## 2024-01-29 PROCEDURE — 7100000001 HC PACU RECOVERY - ADDTL 15 MIN

## 2024-01-29 PROCEDURE — 7100000000 HC PACU RECOVERY - FIRST 15 MIN

## 2024-01-29 PROCEDURE — 93657 TX L/R ATRIAL FIB ADDL: CPT | Performed by: INTERNAL MEDICINE

## 2024-01-29 PROCEDURE — C1759 CATH, INTRA ECHOCARDIOGRAPHY: HCPCS | Performed by: INTERNAL MEDICINE

## 2024-01-29 PROCEDURE — 86900 BLOOD TYPING SEROLOGIC ABO: CPT

## 2024-01-29 PROCEDURE — 6360000002 HC RX W HCPCS: Performed by: NURSE ANESTHETIST, CERTIFIED REGISTERED

## 2024-01-29 PROCEDURE — 93656 COMPRE EP EVAL ABLTJ ATR FIB: CPT | Performed by: INTERNAL MEDICINE

## 2024-01-29 PROCEDURE — 86901 BLOOD TYPING SEROLOGIC RH(D): CPT

## 2024-01-29 PROCEDURE — 85347 COAGULATION TIME ACTIVATED: CPT

## 2024-01-29 PROCEDURE — 2709999900 HC NON-CHARGEABLE SUPPLY: Performed by: INTERNAL MEDICINE

## 2024-01-29 PROCEDURE — 93622 COMP EP EVAL L VENTR PAC&REC: CPT

## 2024-01-29 PROCEDURE — 2580000003 HC RX 258: Performed by: INTERNAL MEDICINE

## 2024-01-29 PROCEDURE — 2580000003 HC RX 258: Performed by: NURSE ANESTHETIST, CERTIFIED REGISTERED

## 2024-01-29 PROCEDURE — 3700000001 HC ADD 15 MINUTES (ANESTHESIA): Performed by: ANESTHESIOLOGY

## 2024-01-29 PROCEDURE — A4216 STERILE WATER/SALINE, 10 ML: HCPCS

## 2024-01-29 PROCEDURE — 2580000003 HC RX 258

## 2024-01-29 PROCEDURE — 93656 COMPRE EP EVAL ABLTJ ATR FIB: CPT

## 2024-01-29 PROCEDURE — 6360000002 HC RX W HCPCS

## 2024-01-29 PROCEDURE — 93657 TX L/R ATRIAL FIB ADDL: CPT

## 2024-01-29 PROCEDURE — 93623 PRGRMD STIMJ&PACG IV RX NFS: CPT

## 2024-01-29 PROCEDURE — C1894 INTRO/SHEATH, NON-LASER: HCPCS | Performed by: INTERNAL MEDICINE

## 2024-01-29 PROCEDURE — 93655 ICAR CATH ABLTJ DSCRT ARRHYT: CPT | Performed by: INTERNAL MEDICINE

## 2024-01-29 PROCEDURE — C1893 INTRO/SHEATH, FIXED,NON-PEEL: HCPCS | Performed by: INTERNAL MEDICINE

## 2024-01-29 PROCEDURE — 86850 RBC ANTIBODY SCREEN: CPT

## 2024-01-29 PROCEDURE — 2500000003 HC RX 250 WO HCPCS

## 2024-01-29 PROCEDURE — 93623 PRGRMD STIMJ&PACG IV RX NFS: CPT | Performed by: INTERNAL MEDICINE

## 2024-01-29 RX ORDER — 0.9 % SODIUM CHLORIDE 0.9 %
1000 INTRAVENOUS SOLUTION INTRAVENOUS
Status: DISCONTINUED | OUTPATIENT
Start: 2024-01-29 | End: 2024-01-30 | Stop reason: HOSPADM

## 2024-01-29 RX ORDER — FLECAINIDE ACETATE 50 MG/1
50 TABLET ORAL 2 TIMES DAILY
Qty: 180 TABLET | Refills: 0 | Status: SHIPPED | OUTPATIENT
Start: 2024-01-29

## 2024-01-29 RX ORDER — FENTANYL CITRATE 50 UG/ML
INJECTION, SOLUTION INTRAMUSCULAR; INTRAVENOUS PRN
Status: DISCONTINUED | OUTPATIENT
Start: 2024-01-29 | End: 2024-01-29 | Stop reason: SDUPTHER

## 2024-01-29 RX ORDER — VASOPRESSIN 20 U/ML
INJECTION PARENTERAL PRN
Status: DISCONTINUED | OUTPATIENT
Start: 2024-01-29 | End: 2024-01-29 | Stop reason: SDUPTHER

## 2024-01-29 RX ORDER — LIDOCAINE HYDROCHLORIDE AND EPINEPHRINE BITARTRATE 20; .01 MG/ML; MG/ML
15 INJECTION, SOLUTION SUBCUTANEOUS SEE ADMIN INSTRUCTIONS
Status: DISCONTINUED | OUTPATIENT
Start: 2024-01-29 | End: 2024-01-30 | Stop reason: HOSPADM

## 2024-01-29 RX ORDER — ALBUTEROL SULFATE 90 UG/1
2 AEROSOL, METERED RESPIRATORY (INHALATION) EVERY 6 HOURS PRN
Status: DISCONTINUED | OUTPATIENT
Start: 2024-01-29 | End: 2024-01-29 | Stop reason: HOSPADM

## 2024-01-29 RX ORDER — PANTOPRAZOLE SODIUM 40 MG/1
40 TABLET, DELAYED RELEASE ORAL
Status: DISCONTINUED | OUTPATIENT
Start: 2024-01-30 | End: 2024-01-29 | Stop reason: HOSPADM

## 2024-01-29 RX ORDER — VECURONIUM BROMIDE 1 MG/ML
INJECTION, POWDER, LYOPHILIZED, FOR SOLUTION INTRAVENOUS PRN
Status: DISCONTINUED | OUTPATIENT
Start: 2024-01-29 | End: 2024-01-29 | Stop reason: SDUPTHER

## 2024-01-29 RX ORDER — HEPARIN SODIUM 1000 [USP'U]/ML
INJECTION, SOLUTION INTRAVENOUS; SUBCUTANEOUS PRN
Status: DISCONTINUED | OUTPATIENT
Start: 2024-01-29 | End: 2024-01-29 | Stop reason: SDUPTHER

## 2024-01-29 RX ORDER — DEXAMETHASONE SODIUM PHOSPHATE 4 MG/ML
INJECTION, SOLUTION INTRA-ARTICULAR; INTRALESIONAL; INTRAMUSCULAR; INTRAVENOUS; SOFT TISSUE PRN
Status: DISCONTINUED | OUTPATIENT
Start: 2024-01-29 | End: 2024-01-29 | Stop reason: SDUPTHER

## 2024-01-29 RX ORDER — HEPARIN SODIUM 10000 [USP'U]/100ML
INJECTION, SOLUTION INTRAVENOUS CONTINUOUS PRN
Status: DISCONTINUED | OUTPATIENT
Start: 2024-01-29 | End: 2024-01-29 | Stop reason: SDUPTHER

## 2024-01-29 RX ORDER — SODIUM CHLORIDE 9 MG/ML
INJECTION, SOLUTION INTRAVENOUS PRN
Status: DISCONTINUED | OUTPATIENT
Start: 2024-01-29 | End: 2024-01-30 | Stop reason: HOSPADM

## 2024-01-29 RX ORDER — SODIUM CHLORIDE 0.9 % (FLUSH) 0.9 %
5-40 SYRINGE (ML) INJECTION PRN
Status: DISCONTINUED | OUTPATIENT
Start: 2024-01-29 | End: 2024-01-30 | Stop reason: HOSPADM

## 2024-01-29 RX ORDER — DOXAZOSIN 2 MG/1
8 TABLET ORAL NIGHTLY
Status: DISCONTINUED | OUTPATIENT
Start: 2024-01-29 | End: 2024-01-29 | Stop reason: HOSPADM

## 2024-01-29 RX ORDER — BUDESONIDE AND FORMOTEROL FUMARATE DIHYDRATE 160; 4.5 UG/1; UG/1
2 AEROSOL RESPIRATORY (INHALATION) DAILY
Status: DISCONTINUED | OUTPATIENT
Start: 2024-01-29 | End: 2024-01-29 | Stop reason: HOSPADM

## 2024-01-29 RX ORDER — ONDANSETRON 2 MG/ML
4 INJECTION INTRAMUSCULAR; INTRAVENOUS
Status: DISCONTINUED | OUTPATIENT
Start: 2024-01-29 | End: 2024-01-30 | Stop reason: HOSPADM

## 2024-01-29 RX ORDER — SODIUM CHLORIDE 0.9 % (FLUSH) 0.9 %
5-40 SYRINGE (ML) INJECTION EVERY 12 HOURS SCHEDULED
Status: DISCONTINUED | OUTPATIENT
Start: 2024-01-29 | End: 2024-01-30 | Stop reason: HOSPADM

## 2024-01-29 RX ORDER — FLUTICASONE PROPIONATE 50 MCG
1 SPRAY, SUSPENSION (ML) NASAL DAILY PRN
Status: DISCONTINUED | OUTPATIENT
Start: 2024-01-29 | End: 2024-01-29 | Stop reason: HOSPADM

## 2024-01-29 RX ORDER — PHENYLEPHRINE HCL IN 0.9% NACL 1 MG/10 ML
SYRINGE (ML) INTRAVENOUS PRN
Status: DISCONTINUED | OUTPATIENT
Start: 2024-01-29 | End: 2024-01-29 | Stop reason: SDUPTHER

## 2024-01-29 RX ORDER — LISINOPRIL 40 MG/1
40 TABLET ORAL DAILY
Status: DISCONTINUED | OUTPATIENT
Start: 2024-01-29 | End: 2024-01-29 | Stop reason: HOSPADM

## 2024-01-29 RX ORDER — ACETAMINOPHEN 325 MG/1
650 TABLET ORAL EVERY 4 HOURS PRN
Status: DISCONTINUED | OUTPATIENT
Start: 2024-01-29 | End: 2024-01-30 | Stop reason: HOSPADM

## 2024-01-29 RX ORDER — PROTAMINE SULFATE 10 MG/ML
INJECTION, SOLUTION INTRAVENOUS PRN
Status: DISCONTINUED | OUTPATIENT
Start: 2024-01-29 | End: 2024-01-29 | Stop reason: SDUPTHER

## 2024-01-29 RX ORDER — MIDAZOLAM HYDROCHLORIDE 1 MG/ML
INJECTION INTRAMUSCULAR; INTRAVENOUS PRN
Status: DISCONTINUED | OUTPATIENT
Start: 2024-01-29 | End: 2024-01-29 | Stop reason: SDUPTHER

## 2024-01-29 RX ORDER — PROPOFOL 10 MG/ML
INJECTION, EMULSION INTRAVENOUS PRN
Status: DISCONTINUED | OUTPATIENT
Start: 2024-01-29 | End: 2024-01-29 | Stop reason: SDUPTHER

## 2024-01-29 RX ORDER — GLYCOPYRROLATE 0.2 MG/ML
INJECTION INTRAMUSCULAR; INTRAVENOUS PRN
Status: DISCONTINUED | OUTPATIENT
Start: 2024-01-29 | End: 2024-01-29 | Stop reason: SDUPTHER

## 2024-01-29 RX ORDER — DOPAMINE HYDROCHLORIDE 160 MG/100ML
INJECTION, SOLUTION INTRAVENOUS CONTINUOUS PRN
Status: DISCONTINUED | OUTPATIENT
Start: 2024-01-29 | End: 2024-01-29 | Stop reason: SDUPTHER

## 2024-01-29 RX ORDER — PROTAMINE SULFATE 10 MG/ML
30 INJECTION, SOLUTION INTRAVENOUS
Status: DISCONTINUED | OUTPATIENT
Start: 2024-01-29 | End: 2024-01-30 | Stop reason: HOSPADM

## 2024-01-29 RX ORDER — FUROSEMIDE 10 MG/ML
INJECTION INTRAMUSCULAR; INTRAVENOUS PRN
Status: DISCONTINUED | OUTPATIENT
Start: 2024-01-29 | End: 2024-01-29 | Stop reason: SDUPTHER

## 2024-01-29 RX ORDER — METOPROLOL SUCCINATE 25 MG/1
25 TABLET, EXTENDED RELEASE ORAL DAILY
Status: DISCONTINUED | OUTPATIENT
Start: 2024-01-29 | End: 2024-01-29 | Stop reason: HOSPADM

## 2024-01-29 RX ADMIN — PROTAMINE SULFATE 125 MG: 10 INJECTION, SOLUTION INTRAVENOUS at 10:28

## 2024-01-29 RX ADMIN — Medication 200 MCG: at 08:51

## 2024-01-29 RX ADMIN — HEPARIN SODIUM 5500 UNITS: 1000 INJECTION INTRAVENOUS; SUBCUTANEOUS at 09:26

## 2024-01-29 RX ADMIN — VECURONIUM BROMIDE 8 MG: 1 INJECTION, POWDER, LYOPHILIZED, FOR SOLUTION INTRAVENOUS at 08:40

## 2024-01-29 RX ADMIN — GLYCOPYRROLATE 0.2 MG: 0.2 INJECTION, SOLUTION INTRAMUSCULAR; INTRAVENOUS at 08:38

## 2024-01-29 RX ADMIN — FENTANYL CITRATE 50 MCG: 50 INJECTION INTRAMUSCULAR; INTRAVENOUS at 08:38

## 2024-01-29 RX ADMIN — VECURONIUM BROMIDE 2 MG: 1 INJECTION, POWDER, LYOPHILIZED, FOR SOLUTION INTRAVENOUS at 08:39

## 2024-01-29 RX ADMIN — PROPOFOL 150 MG: 10 INJECTION, EMULSION INTRAVENOUS at 08:39

## 2024-01-29 RX ADMIN — DEXAMETHASONE SODIUM PHOSPHATE 10 MG: 4 INJECTION, SOLUTION INTRAMUSCULAR; INTRAVENOUS at 09:10

## 2024-01-29 RX ADMIN — DOPAMINE HYDROCHLORIDE IN DEXTROSE 10 MCG/KG/MIN: 1.6 INJECTION, SOLUTION INTRAVENOUS at 10:17

## 2024-01-29 RX ADMIN — HEPARIN SODIUM 5000 UNITS: 1000 INJECTION INTRAVENOUS; SUBCUTANEOUS at 09:23

## 2024-01-29 RX ADMIN — HEPARIN SODIUM 10000 UNITS/HR: 10000 INJECTION, SOLUTION INTRAVENOUS at 09:45

## 2024-01-29 RX ADMIN — VASOPRESSIN 1 UNITS: 20 INJECTION INTRAVENOUS at 08:55

## 2024-01-29 RX ADMIN — SUGAMMADEX 200 MG: 100 INJECTION, SOLUTION INTRAVENOUS at 10:32

## 2024-01-29 RX ADMIN — SUGAMMADEX 200 MG: 100 INJECTION, SOLUTION INTRAVENOUS at 10:36

## 2024-01-29 RX ADMIN — PROPOFOL 50 MG: 10 INJECTION, EMULSION INTRAVENOUS at 09:24

## 2024-01-29 RX ADMIN — SODIUM CHLORIDE: 9 INJECTION, SOLUTION INTRAVENOUS at 08:08

## 2024-01-29 RX ADMIN — VECURONIUM BROMIDE 2 MG: 1 INJECTION, POWDER, LYOPHILIZED, FOR SOLUTION INTRAVENOUS at 10:25

## 2024-01-29 RX ADMIN — FENTANYL CITRATE 50 MCG: 50 INJECTION INTRAMUSCULAR; INTRAVENOUS at 09:24

## 2024-01-29 RX ADMIN — PHENYLEPHRINE HYDROCHLORIDE 20 MCG/MIN: 10 INJECTION INTRAVENOUS at 09:11

## 2024-01-29 RX ADMIN — PROPOFOL 40 MG: 10 INJECTION, EMULSION INTRAVENOUS at 10:25

## 2024-01-29 RX ADMIN — VECURONIUM BROMIDE 10 MG: 1 INJECTION, POWDER, LYOPHILIZED, FOR SOLUTION INTRAVENOUS at 09:23

## 2024-01-29 RX ADMIN — Medication 100 MCG: at 09:24

## 2024-01-29 RX ADMIN — FUROSEMIDE 40 MG: 10 INJECTION, SOLUTION INTRAVENOUS at 10:42

## 2024-01-29 RX ADMIN — MIDAZOLAM 2 MG: 1 INJECTION INTRAMUSCULAR; INTRAVENOUS at 08:30

## 2024-01-29 ASSESSMENT — PAIN SCALES - GENERAL
PAINLEVEL_OUTOF10: 0
PAINLEVEL_OUTOF10: 2
PAINLEVEL_OUTOF10: 0

## 2024-01-29 ASSESSMENT — ENCOUNTER SYMPTOMS: SHORTNESS OF BREATH: 1

## 2024-01-29 ASSESSMENT — PAIN DESCRIPTION - LOCATION: LOCATION: NECK

## 2024-01-29 NOTE — TELEPHONE ENCOUNTER
Spoke with patient new RX for Flecainide 50 mg BID sent to [facility] pharmacy on [facility].      Per procedure note: 01/29/2024  Plan:   The patient will be monitored and receive the usual post ablation care. If there are no complications, the patient will be discharged from the hospital either later today or tomorrow with a new prescription for Flecainide 50mg po BID, pre-admission Verapamil CD 180mg daily, and pre-admission Eliquis 5mg po BID. The toprol XL will be discontinued. The ASA has already been discontinued in lieu of Eliquis. The Medtronic 2-ch PPM will be interrogated routine schedule as an outpatient. The patient will follow-up with the EP Nurse Practitioner in 3 month's time.

## 2024-01-29 NOTE — PROGRESS NOTES
Received from cath lab post ablation, sleeping, awakens to name.  Right groin site intact with figure 8 sutures. Family at bedside,    1140 figure 8 sutures removed, Quik clot and tegaderm applied. No bleeding or swelling at site.    1230 voided total of 900cc urine. Ambulated to bathroom, gait steady, voided a large amount.  Returned to room    1300 discharge instructions given, IV discontinued    1305 to car via wheelchair, discharged to home

## 2024-01-29 NOTE — DISCHARGE INSTRUCTIONS
CARDIAC ABLATION    Care of your puncture site:  Remove bandage 24 hours after the procedure.  May shower in 24 hours but do not sit in a bathtub/pool of water for 5 days or until the wound is healed.  Gently clean groin using soap and water.  Dry thoroughly and apply a Band-Aid that covers the entire site. Use Band-Aid until skin heals over in about 3-5 days.  Do not apply powder or lotion.    Limit walking and stair climbing today.      Normal Observations:  Soreness or tenderness which may last one week.  Mild oozing from the incision site.  Possible bruising that could last 2 weeks.  You may experience chest burning that will peak in 2 days of the procedure. The burning will begin to subside the following days.  You may take Tylenol for the discomfort    Activity:  You may resume driving 24 hours following the procedure.  Do not make important / legal decisions within 24 hours after procedure.  Do not drink alcoholic beverages or take any sleeping pills for 24 hours.  You may resume normal activity in 3 days or after the wound heals.  Avoid lifting more than 10 pounds for 3 days or until the wound heals.  Avoid strenuous exercise or activity for 1 week.      Nutrition:  Regular diet     Call your doctor immediately if your condition worsens, for any other concerns, for a follow-up appointment or if you experience any of the following:  Increased swelling on the groin or leg.  Unusual pain, numbness, or tingling of the groin or down the leg.  Any signs of infection such as: redness, yellow drainage at the site, swelling or pain.    IF GROIN STARTS BLEEDING SIGNIFICANTLY:   LAY FLAT, HOLD FIRM DIRECT PRESSURE, AND CALL 911

## 2024-01-29 NOTE — TELEPHONE ENCOUNTER
Luis called in this afternoon, he states he had an ablation this morning, and he was told that some medication would be sent to pharmacy after ablation, he is unaware of what medication.      He can be reached at [phone].

## 2024-01-29 NOTE — H&P
Cardiac Electrophysiology Consultation   Date: 1/30/2024   Reason for Consultation: Paroxysmal atrial fibrillation  Consult Requesting Physician: Rachid Delaney (Inactive)   Primary Care Physician: Rachid Delaney MD     Chief Complaint:       Chief Complaint   Patient presents with    Follow-Up from Hospital    Device Check         HPI: Luis Zambrano is a 73 y.o. patient with a history of paroxymal atrial fibrillation, essential hypertension, hyperlipidemia,high grade AV block s/p implantation of dual chamber pacemaker  syncope,  left bundle branch block and upper gastrointestinal bleeding.  He was hospitalized at [facility] from 7/22/2022 to 7/24/2022 for upper GI bleed. He underwent esophagogastroduodenoscopy with biopsy on 7/24/2022 which showed 2 small clean based gastric ulcers measuring 6-8mm in size, located in the gastric antrum, just proximal to the pyloric channel with no high risk stigmata. Mild gastric erythema in the gastric body, greater curvature. Non obstructive Schatzki ring at the GE junction. A 1 cm hiatal hernia and mild duodenitis in the duodenal bulb. He is not on OAC and was previously followed by Karthik Aguilar MD and had discussions regarding  on OAC but he declined. He was seen in office by Karthik Aguilar MD on 09/12/2023 and reported worsening shortness of breath on exertion. NM stress test and Echo were ordered but patient did not complete.     Interval History: Today, he presents to the office with his wife and daughter for hospital follow up to discuss treatment options for atrial fibrillation. EKG today shows ventricular paced with v-rate of 60 bpm.  He is compliant with his medications and tolerating them well. He denies chest pain/pressure, tightness, edema, shortness of breath, heart racing, palpitations, lightheadedness, dizziness, syncope, presyncope,  PND or orthopnea.           Past Medical History        Past Medical History:   Diagnosis Date    Atrial

## 2024-01-29 NOTE — PROGRESS NOTES
Patient received from EP, report from Cyndi PARK and Mary Carmen PEREA. Patient placed on monitor, pt presents with V-paced rhythm, on 3L of oxygen via nasal cannula. Patient able to move all extremities, answer orientation questions, follows commands. Al score is 9/10 at the time of handoff with ELIAZAR Victoria.

## 2024-01-29 NOTE — ANESTHESIA POSTPROCEDURE EVALUATION
Department of Anesthesiology  Postprocedure Note    Patient: Luis Zambrano  MRN: [id number]  YOB: 1950  Date of evaluation: 1/29/2024    Procedure Summary       Date: 01/29/24 Room / Location: [facility] Cath Lab    Anesthesia Start: 0833 Anesthesia Stop: 1045    Procedure: WST DON AFIB ABLATION W ANES Diagnosis:     Scheduled Providers:  Responsible Provider: Rachid Matamoros MD    Anesthesia Type: general ASA Status: 3            Anesthesia Type: No value filed.    Al Phase I: Al Score: 9    Al Phase II:      Anesthesia Post Evaluation    Patient location during evaluation: PACU  Level of consciousness: awake and alert  Airway patency: patent  Nausea & Vomiting: no nausea and no vomiting  Cardiovascular status: blood pressure returned to baseline  Respiratory status: acceptable  Hydration status: euvolemic  Comments: Postoperative Anesthesia Note    Name:    Luis Zambrano  MRN:      [id number]    Patient Vitals in the past 12 hrs:  01/29/24 1115, BP:128/69, Pulse:60, SpO2:94 %  01/29/24 1100, BP:126/74, Pulse:60, Resp:11, SpO2:94 %  01/29/24 1055, BP:132/73, Pulse:60, Resp:10, SpO2:92 %  01/29/24 1050, BP:133/73, Pulse:61, Resp:13, SpO2:92 %  01/29/24 1045, BP:135/69, Pulse:61, Resp:11, SpO2:91 %  01/29/24 1041, BP:(!) 141/62, Temp:(!) 96.5 °F (35.8 °C), Temp src:Temporal, Pulse:60, Resp:11, SpO2:92 %  01/29/24 0645, BP:(!) 145/92, Pulse:80, Resp:15, SpO2:98 %, Height:1.753 m (5' 9\"), Weight:103.9 kg (229 lb)     LABS:    CBC  Lab Results       Component                Value               Date/Time                  WBC                      4.7                 01/25/2024 09:51 AM        HGB                      15.8                01/25/2024 09:51 AM        HCT                      46.3                01/25/2024 09:51 AM        PLT                      136                 01/25/2024 09:51 AM   RENAL  Lab Results       Component                Value               Date/Time

## 2024-01-29 NOTE — PROCEDURES
isolations using wide area circumferential radiofrequency ablation   - Additional ablation with creation of roof line (for roof-dependent left atrial flutter), anterior line from the mitral annulus to the roof (for anterior wall dependent left atrial flutter), and mitral isthmus (for mitral isthmus dependent left atrial flutter)  - Ablation of complex fractionated atrial electrogram in the left atrial septal wall (for atrial fibrillation)  - Ablation of complex fractionated atrial electrogram in the left atrial anterior but inferior wall (for atrial fibrillation)    Plan:   The patient will be monitored and receive the usual post ablation care. If there are no complications, the patient will be discharged from the hospital either later today or tomorrow with a new prescription for Flecainide 50mg po BID, pre-admission Verapamil CD 180mg daily, and pre-admission Eliquis 5mg po BID. The toprol XL will be discontinued. The ASA has already been discontinued in lieu of Eliquis. The Medtronic 2-ch PPM will be interrogated routine schedule as an outpatient. The patient will follow-up with the EP Nurse Practitioner in 3 month's time.    Thank you for allowing us to participate in the care of your patient. If you have any questions or concerns, please do not hesitate to contact me.    Mumtaz De La Rosa MD, MS, FACC, RS  Cardiac Electrophysiology  [facility]  [address], [address]  [place], [place] [address] [phone]

## 2024-01-29 NOTE — ANESTHESIA PRE PROCEDURE
[facility] Once Iona Johnston, APRN - CNP         Current Outpatient Medications   Medication Sig Dispense Refill   • pantoprazole (PROTONIX) 40 MG tablet      • verapamil (CALAN SR) 180 MG extended release tablet Take 1 tablet by mouth nightly     • metoprolol succinate (TOPROL XL) 25 MG extended release tablet Take 2 tablets by mouth daily Patient is taking 50mg tablets 30 tablet 3   • apixaban (ELIQUIS) 5 MG TABS tablet Take 1 tablet by mouth 2 times daily 60 tablet 2   • Multiple Vitamins-Minerals (THERAPEUTIC MULTIVITAMIN-MINERALS) tablet Take 1 tablet by mouth daily     • Omega-3 Fatty Acids (FISH OIL BURP-LESS PO) Take by mouth daily     • vitamin C (ASCORBIC ACID) 500 MG tablet Take 1 tablet by mouth daily     • lisinopril (PRINIVIL;ZESTRIL) 40 MG tablet TAKE ONE TABLET BY MOUTH DAILY (Patient taking differently: Take 1 tablet by mouth daily) 90 tablet 2   • PROAIR  (90 BASE) MCG/ACT inhaler INHALE TWO PUFFS BY MOUTH FOUR TIMES A DAY AS NEEDED (Patient taking differently: Inhale 2 puffs into the lungs every 6 hours as needed for Shortness of Breath or Wheezing) 1 Inhaler 10   • doxazosin (CARDURA) 8 MG tablet Take 1 tablet by mouth nightly 90 tablet 3   • budesonide-formoterol (SYMBICORT) 160-4.5 MCG/ACT AERO Inhale 2 puffs into the lungs 2 times daily (Patient taking differently: Inhale 2 puffs into the lungs daily) 1 Inhaler 3   • fluticasone (FLONASE) 50 MCG/ACT nasal spray PLACE 2 SPRAYS IN EACH NOSTRIL DAILY (Patient taking differently: 1 spray by Nasal route daily as needed) 1 Bottle 10     Current Facility-Administered Medications   Medication Dose Route Frequency Provider Last Rate Last Admin   • sodium chloride flush 0.9 % injection 5-40 mL  5-40 mL IntraVENous 2 times per day Mumtaz De La Rosa MD       • sodium chloride flush 0.9 % injection 5-40 mL  5-40 mL IntraVENous PRN Mumtaz De La Rosa MD       • 0.9 % sodium chloride infusion   IntraVENous PRN Mumtaz De La Rosa MD       • cyanocobalamin

## 2024-01-29 NOTE — PROGRESS NOTES
PRE-PROCEDURE    Received as outpatient for atrial fib ablation, family present    DATE: 1/29/2024 ARRIVAL TO CATH LAB: 6:30 AM    ADMIT SOURCE: Outpatient    ID & ALLERGY BAND: On    CONSENT: Yes    NPO SINCE: Midnight    IV SITE : Started in left arm.  with fluids infusing at kvo 6:30 AM       BLEEDING PROBLEMS: No    LAST DOSE     Anticoagulants (Eliquis): 1/27/24      OTHER MEDICATIONS TAKEN AT HOME:symbicort, cardura, lisinopril, metoprolol,protonix,      MEDICATION COMPLIANCE: Yes

## 2024-03-04 ENCOUNTER — HOSPITAL ENCOUNTER (OUTPATIENT)
Dept: NON INVASIVE DIAGNOSTICS | Age: 74
Discharge: HOME | End: 2024-03-04
Payer: MEDICARE

## 2024-03-04 DIAGNOSIS — R06.09 DOE (DYSPNEA ON EXERTION): ICD-10-CM

## 2024-03-04 PROCEDURE — 93306 TTE W/DOPPLER COMPLETE: CPT

## 2024-03-04 PROCEDURE — A9502 TC99M TETROFOSMIN: HCPCS | Performed by: INTERNAL MEDICINE

## 2024-03-04 PROCEDURE — 3430000000 HC RX DIAGNOSTIC RADIOPHARMACEUTICAL: Performed by: INTERNAL MEDICINE

## 2024-03-04 PROCEDURE — 6360000002 HC RX W HCPCS: Performed by: INTERNAL MEDICINE

## 2024-03-04 PROCEDURE — 78452 HT MUSCLE IMAGE SPECT MULT: CPT

## 2024-03-04 PROCEDURE — 93017 CV STRESS TEST TRACING ONLY: CPT

## 2024-03-04 RX ORDER — REGADENOSON 0.08 MG/ML
0.4 INJECTION, SOLUTION INTRAVENOUS
Status: COMPLETED | OUTPATIENT
Start: 2024-03-04 | End: 2024-03-04

## 2024-03-04 RX ADMIN — REGADENOSON 0.4 MG: 0.08 INJECTION, SOLUTION INTRAVENOUS at 11:35

## 2024-03-04 RX ADMIN — TETROFOSMIN 30 MILLICURIE: 1.38 INJECTION, POWDER, LYOPHILIZED, FOR SOLUTION INTRAVENOUS at 11:35

## 2024-03-04 RX ADMIN — TETROFOSMIN 10 MILLICURIE: 1.38 INJECTION, POWDER, LYOPHILIZED, FOR SOLUTION INTRAVENOUS at 10:15

## 2024-03-26 RX ORDER — APIXABAN 5 MG/1
5 TABLET, FILM COATED ORAL 2 TIMES DAILY
Qty: 60 TABLET | Refills: 2 | OUTPATIENT
Start: 2024-03-26

## 2024-05-03 ENCOUNTER — OFFICE VISIT (OUTPATIENT)
Dept: CARDIOLOGY CLINIC | Age: 74
End: 2024-05-03
Payer: MEDICARE

## 2024-05-03 VITALS
SYSTOLIC BLOOD PRESSURE: 130 MMHG | HEART RATE: 70 BPM | BODY MASS INDEX: 31.28 KG/M2 | WEIGHT: 211.2 LBS | OXYGEN SATURATION: 92 % | HEIGHT: 69 IN | DIASTOLIC BLOOD PRESSURE: 70 MMHG

## 2024-05-03 DIAGNOSIS — Z95.0 PACEMAKER: Primary | ICD-10-CM

## 2024-05-03 DIAGNOSIS — I48.0 PAROXYSMAL ATRIAL FIBRILLATION (HCC): ICD-10-CM

## 2024-05-03 DIAGNOSIS — R06.09 DOE (DYSPNEA ON EXERTION): ICD-10-CM

## 2024-05-03 DIAGNOSIS — I10 ESSENTIAL HYPERTENSION: ICD-10-CM

## 2024-05-03 DIAGNOSIS — I42.9 CARDIOMYOPATHY, UNSPECIFIED TYPE (HCC): ICD-10-CM

## 2024-05-03 PROCEDURE — 1123F ACP DISCUSS/DSCN MKR DOCD: CPT | Performed by: NURSE PRACTITIONER

## 2024-05-03 PROCEDURE — 93000 ELECTROCARDIOGRAM COMPLETE: CPT | Performed by: NURSE PRACTITIONER

## 2024-05-03 PROCEDURE — 3075F SYST BP GE 130 - 139MM HG: CPT | Performed by: NURSE PRACTITIONER

## 2024-05-03 PROCEDURE — 99214 OFFICE O/P EST MOD 30 MIN: CPT | Performed by: NURSE PRACTITIONER

## 2024-05-03 PROCEDURE — 3078F DIAST BP <80 MM HG: CPT | Performed by: NURSE PRACTITIONER

## 2024-05-21 ASSESSMENT — ENCOUNTER SYMPTOMS
WHEEZING: 0
COUGH: 0
APNEA: 0
SHORTNESS OF BREATH: 0
CHEST TIGHTNESS: 0

## 2024-07-02 ENCOUNTER — TELEPHONE (OUTPATIENT)
Dept: CARDIOLOGY CLINIC | Age: 74
End: 2024-07-02

## 2024-07-02 DIAGNOSIS — R31.9 HEMATURIA, UNSPECIFIED TYPE: Primary | ICD-10-CM

## 2024-07-02 NOTE — TELEPHONE ENCOUNTER
Called patient left message to return call on who had patient hold his Eliquis and how long was he supposed to hold for.

## 2024-07-02 NOTE — TELEPHONE ENCOUNTER
Medication Question/Concern    What is the name of the medication you need to speak with someone about?  apixaban (ELIQUIS)     Dosage of the medication:  5 MG TABS tablet     How are you taking this medication:  Take 1 tablet by mouth 2 times daily     What issues/concerns are you having with this medication:  Evans states over the weekend he had blood in his urine. He consulted with PCP office, tests were ran, no infection detected. Evans states that he was advised to hold his Eliquis, patient has held medication since Friday, 6/28.    Please advise.    Evans's callback: [phone]

## 2024-07-05 NOTE — TELEPHONE ENCOUNTER
Called pt in regards to message below he v/u and states he has scheduled a appointment for urology in August.

## 2024-07-05 NOTE — TELEPHONE ENCOUNTER
Please see below and advise. Pt is scheduled to see Dr Hudson 07/12/2024 in regards to the watchman.

## 2024-07-05 NOTE — TELEPHONE ENCOUNTER
Dose should remain the same. Please ensure he schedules appt with Urology as states previously to discuss source of bleeding

## 2024-07-05 NOTE — TELEPHONE ENCOUNTER
Discussed with Dr. Hudson. Will refer to urology given blood in urine and may schedule sooner appt with MKW for Watchman consideration. Referral placed.

## 2024-07-05 NOTE — TELEPHONE ENCOUNTER
Pt called back. He said his pcp told him to stop taking eliquis because he was ahving blood in urine. His pcp told him to hold it until blood stop coming in urine.

## 2024-07-05 NOTE — TELEPHONE ENCOUNTER
Evans stopped in this afternoon his PCP told him to hold the Eliquis until  the blood clears up. Had a follow up appointment there is no more blood in his urine.  As of today he has not started taking the medicine yet plans to start back tonight.  I've schedule him an appointment with Dr. Hudson for 7/12/24. He also would like to know if his Eliquis dose should be lower.  Please advise      Call back number [phone]

## 2024-07-11 NOTE — PROGRESS NOTES
COPD (chronic obstructive pulmonary disease) (HCC)     Enlarged prostate     FHx: hyperlipidemia 04/09/1997    GERD (gastroesophageal reflux disease)     History of bleeding ulcers 2022    History of echocardiogram 2009    Hypertension     Pacemaker 2019    Medtronic    Wears glasses       Past Surgical History:   Procedure Laterality Date    CARDIAC CATHETERIZATION      COLONOSCOPY      colon polyp    COLONOSCOPY N/A 10/3/2022    COLONOSCOPY POLYPECTOMY SNARE/COLD BIOPSY performed by Dhaval Nagel MD at [facility] ENDOSCOPY    PROSTATE BIOPSY  2016    per patient     TURP  06/29/2016    with cysto    UPPER GASTROINTESTINAL ENDOSCOPY N/A 07/24/2022    EGD BIOPSY performed by Tiff Hubbard MD at [facility] ENDOSCOPY    UPPER GASTROINTESTINAL ENDOSCOPY N/A 10/3/2022    ESOPHAGOGASTRODUODENOSCOPY performed by Dhaval Nagel MD at [facility] ENDOSCOPY       Allergies:  No Known Allergies    Medication:   Prior to Admission medications    Medication Sig Start Date End Date Taking? Authorizing Provider   pantoprazole (PROTONIX) 40 MG tablet  1/2/24   Tam Delgado MD   verapamil (CALAN SR) 180 MG extended release tablet Take 1 tablet by mouth nightly    ProviderTam MD   apixaban (ELIQUIS) 5 MG TABS tablet Take 1 tablet by mouth 2 times daily 12/30/23   Александр Lara MD   Multiple Vitamins-Minerals (THERAPEUTIC MULTIVITAMIN-MINERALS) tablet Take 1 tablet by mouth daily    ProviderTam MD   Omega-3 Fatty Acids (FISH OIL BURP-LESS PO) Take by mouth daily    Tam Delgado MD   vitamin C (ASCORBIC ACID) 500 MG tablet Take 1 tablet by mouth daily    Tam Delgado MD   lisinopril (PRINIVIL;ZESTRIL) 40 MG tablet TAKE ONE TABLET BY MOUTH DAILY  Patient taking differently: Take 1 tablet by mouth daily 3/14/17   Sadie Goodson MD   PROAIR  (90 BASE) MCG/ACT inhaler INHALE TWO PUFFS BY MOUTH FOUR TIMES A DAY AS NEEDED  Patient taking differently: Inhale 2 puffs into

## 2024-07-12 ENCOUNTER — OFFICE VISIT (OUTPATIENT)
Dept: CARDIOLOGY CLINIC | Age: 74
End: 2024-07-12

## 2024-07-12 ENCOUNTER — NURSE ONLY (OUTPATIENT)
Dept: CARDIOLOGY CLINIC | Age: 74
End: 2024-07-12

## 2024-07-12 VITALS
DIASTOLIC BLOOD PRESSURE: 78 MMHG | HEIGHT: 69 IN | BODY MASS INDEX: 31.19 KG/M2 | OXYGEN SATURATION: 97 % | SYSTOLIC BLOOD PRESSURE: 130 MMHG | HEART RATE: 72 BPM

## 2024-07-12 DIAGNOSIS — I44.30 INFRA-HIS ATRIOVENTRICULAR BLOCK: ICD-10-CM

## 2024-07-12 DIAGNOSIS — R31.9 HEMATURIA, UNSPECIFIED TYPE: Primary | ICD-10-CM

## 2024-07-12 DIAGNOSIS — Z95.0 PACEMAKER: Primary | ICD-10-CM

## 2024-07-12 DIAGNOSIS — I44.7 LBBB (LEFT BUNDLE BRANCH BLOCK): ICD-10-CM

## 2024-07-12 NOTE — PATIENT INSTRUCTIONS
- Keep your appointment scheduled with the urologist in August   - Make an appointment with Dr. Marley

## 2024-08-01 ENCOUNTER — OFFICE VISIT (OUTPATIENT)
Dept: CARDIOLOGY CLINIC | Age: 74
End: 2024-08-01
Payer: MEDICARE

## 2024-08-01 VITALS
WEIGHT: 202 LBS | HEIGHT: 69 IN | OXYGEN SATURATION: 97 % | BODY MASS INDEX: 29.92 KG/M2 | DIASTOLIC BLOOD PRESSURE: 66 MMHG | SYSTOLIC BLOOD PRESSURE: 132 MMHG | HEART RATE: 70 BPM

## 2024-08-01 DIAGNOSIS — I48.91 ATRIAL FIBRILLATION, UNSPECIFIED TYPE (HCC): Primary | ICD-10-CM

## 2024-08-01 DIAGNOSIS — E66.3 OVER WEIGHT: ICD-10-CM

## 2024-08-01 DIAGNOSIS — R94.39 ABNORMAL STRESS TEST: ICD-10-CM

## 2024-08-01 DIAGNOSIS — Z87.19 HISTORY OF GI BLEED: ICD-10-CM

## 2024-08-01 DIAGNOSIS — R31.9 HEMATURIA, UNSPECIFIED TYPE: ICD-10-CM

## 2024-08-01 DIAGNOSIS — I10 ESSENTIAL HYPERTENSION: ICD-10-CM

## 2024-08-01 DIAGNOSIS — I26.99 PULMONARY EMBOLISM ON LONG-TERM ANTICOAGULATION THERAPY (HCC): ICD-10-CM

## 2024-08-01 DIAGNOSIS — Z79.01 PULMONARY EMBOLISM ON LONG-TERM ANTICOAGULATION THERAPY (HCC): ICD-10-CM

## 2024-08-01 DIAGNOSIS — I48.0 PAF (PAROXYSMAL ATRIAL FIBRILLATION) (HCC): ICD-10-CM

## 2024-08-01 DIAGNOSIS — Z91.89 AT RISK FOR ACUTE ISCHEMIC CARDIAC EVENT: ICD-10-CM

## 2024-08-01 PROCEDURE — 99204 OFFICE O/P NEW MOD 45 MIN: CPT | Performed by: INTERNAL MEDICINE

## 2024-08-01 PROCEDURE — 3078F DIAST BP <80 MM HG: CPT | Performed by: INTERNAL MEDICINE

## 2024-08-01 PROCEDURE — 3075F SYST BP GE 130 - 139MM HG: CPT | Performed by: INTERNAL MEDICINE

## 2024-08-01 PROCEDURE — 93000 ELECTROCARDIOGRAM COMPLETE: CPT | Performed by: INTERNAL MEDICINE

## 2024-08-01 PROCEDURE — 1123F ACP DISCUSS/DSCN MKR DOCD: CPT | Performed by: INTERNAL MEDICINE

## 2024-08-01 RX ORDER — FLUTICASONE FUROATE AND VILANTEROL TRIFENATATE 200; 25 UG/1; UG/1
1 POWDER RESPIRATORY (INHALATION) DAILY
COMMUNITY
Start: 2024-07-07

## 2024-08-01 NOTE — PROGRESS NOTES
thyromegaly present. No lymphadenopathy present.  Cardiovascular: Normal rate. Normal heart sounds. Exam reveals no gallop and no friction rub. No murmur heard.  Pulmonary/Chest: Effort normal and breath sounds normal. No respiratory distress. He has no wheezes, rhonchi or rales.   Abdominal: Soft, non-tender. Bowel sounds are normal. He exhibits no organomegaly, mass or bruit.   Extremities: No edema, cyanosis, or clubbing. Pulses are 2+ radial/dorsalis pedis/posterior tibial/carotid bilaterally.  Neurological: No gross cranial nerve deficit. Coordination normal.   Skin: Skin is warm and dry. There is no rash or diaphoresis.   Psychiatric: He has a normal mood and affect. His speech is normal and behavior is normal.     Lab Review:   Lab Results   Component Value Date/Time    TRIG 123 12/30/2023 05:41 AM    HDL 49 12/30/2023 05:41 AM      Lab Results   Component Value Date/Time    BUN 17 01/25/2024 09:51 AM    CREATININE 0.8 01/25/2024 09:51 AM     [id number] Score for Atrial Fibrillation Stroke Risk   Risk   Factors  Component Value   C CHF No 0   H HTN Yes 1   A2 Age >= 75 Yes,  (74 y.o.) 2   D DM No 0   S2 Prior Stroke/TIA No 0   V Vascular Disease No 0   A Age 65-74 No,  (74 y.o.) 0   Sc Sex male 0    [id number]  Score  3   Score last updated 8/1/24 3:51 PM EDT    Click here for a link to the UpToDate guideline \"Atrial Fibrillation: Anticoagulation therapy to prevent embolization    Disclaimer: Risk Score calculation is dependent on accuracy of patient problem list and past encounter diagnosis.      The 10-year ASCVD risk score (Gloria BOBO, et al., 2019) is: 30.5%    Values used to calculate the score:      Age: 74 years      Sex: Male      Is Non- : No      Diabetic: No      Tobacco smoker: No      Systolic Blood Pressure: 132 mmHg      Is BP treated: Yes      HDL Cholesterol: 49 mg/dL      Total Cholesterol: 173 mg/dL    EKG Interpretation: 8/1/24    Image Review:     [facility] 2009

## 2024-08-08 ENCOUNTER — TELEPHONE (OUTPATIENT)
Dept: CARDIOLOGY CLINIC | Age: 74
End: 2024-08-08

## 2024-08-08 NOTE — TELEPHONE ENCOUNTER
Malik from authorization department stated that calcium score test was denied - peer peer is needed. Marley will need to call the insurance company ([facility] [employer])    P [phone]  Case # [id number]

## 2024-08-09 NOTE — TELEPHONE ENCOUNTER
Called scheduling Prior [facility] department [phone], spoke with Cruzito. Flanagan pay for test is $109.00.  Called patient and made aware.    Patient said that he will pay for procedure.

## 2024-08-09 NOTE — TELEPHONE ENCOUNTER
Please call patient and find out if he is willing to pay out of pocket for the testing. We can find out how much it would be (I believe there is a price line that we can call). If he is not able to then Dr Marley will consider peer to peer.

## 2024-08-13 ENCOUNTER — TELEPHONE (OUTPATIENT)
Dept: CARDIOLOGY CLINIC | Age: 74
End: 2024-08-13

## 2024-08-13 NOTE — TELEPHONE ENCOUNTER
Patient was seen in office on 8/1/24     Referring Physician: Dr. Hudson  Reason for Referral: Consideration for the Watchman LAAC device      CC: \"consult on the watchman    Eliquis was not ordered by Dr. Marley.  I do not see where this was changed by Dr. Marley.  Rx in Epic ordered by Dr. Александр Lara.  Not sure if he is currently managing this or Dr. Hudson.  Please advise.  Thank you.

## 2024-08-13 NOTE — TELEPHONE ENCOUNTER
MA's  Patient of Dr. Hudson. Let go ahead and provide samples until after he is seen by Urology mid August as he is a Watchman left atrial appendage device implant candidate as Eliquis will be used short term. Any questions, please ask. Thanks.

## 2024-08-13 NOTE — TELEPHONE ENCOUNTER
Medication Question/Concern    What is the name of the medication you need to speak with someone about?  apixaban (ELIQUIS)     Was this prescribed by your cardiologist?   YES    Dosage of the medication:  5 MG TABS tablet     How are you taking this medication (QD, BID, TID, QID, PRN):  Take 1 tablet by mouth 2 times daily     What issues/concerns are you having with this medication?  Evans is wanting to change his Eliquis medication. He states that for a 30 day supply it's costing him $146. He would like to be switched to Jantoven if possible.     Which Pharmacy are we sending this medication to?  [facility] [id number] [address]     Pharmacy Phone: [phone]    Pharmacy Fax: [phone]

## 2024-08-14 NOTE — TELEPHONE ENCOUNTER
Samples are ready for patient to .    4 boxes  # 56    LOT: [id number]  EXP: 12/2025    RX sample pending     Called spoke with patient is aware and will  samples today.

## 2024-08-15 ENCOUNTER — HOSPITAL ENCOUNTER (OUTPATIENT)
Dept: CT IMAGING | Age: 74
Discharge: HOME | End: 2024-08-15
Attending: INTERNAL MEDICINE
Payer: MEDICARE

## 2024-08-15 DIAGNOSIS — I10 ESSENTIAL HYPERTENSION: ICD-10-CM

## 2024-08-15 DIAGNOSIS — Z91.89 AT RISK FOR ACUTE ISCHEMIC CARDIAC EVENT: ICD-10-CM

## 2024-08-15 DIAGNOSIS — E66.3 OVER WEIGHT: ICD-10-CM

## 2024-08-15 DIAGNOSIS — R94.39 ABNORMAL STRESS TEST: ICD-10-CM

## 2024-08-15 PROCEDURE — 75571 CT HRT W/O DYE W/CA TEST: CPT

## 2024-08-26 ENCOUNTER — TELEPHONE (OUTPATIENT)
Dept: CARDIOLOGY CLINIC | Age: 74
End: 2024-08-26

## 2024-08-26 NOTE — TELEPHONE ENCOUNTER
CARDIAC CLEARANCE     What type of procedure are you having?  CYSTOSCOPY    Which physician is performing your procedure?  DR. ABDULLAHI    When is your procedure scheduled?  9/10    Where are you having this procedure[facility]    Are you taking Blood Thinners?   If so what? (Name/dose/frequesncy)  ELIQUIS 5 MG    Does the surgeon want you to stop your blood thinner?  If so for how long?  NO HOLD NEEDED    Are you having any new or worsening cardiac symptoms?   N/A    Phone Number and Contact Name for Physicians office:  GUILLERMO: [phone]    Fax number to send information:  [phone]    Cardiac History:  Last office visit with Cardiologist:    Cardiac Procedures:    Cardiac Testing:

## 2024-08-26 NOTE — TELEPHONE ENCOUNTER
Preoperative risk assessment    Patient is scheduled to have cystoscopy. She does not need to hold any medication..    Last office visit 7/12/2024  Hx Hematuria, PAF, High grade AV block s/p PPM

## 2024-09-04 ENCOUNTER — TELEPHONE (OUTPATIENT)
Dept: CARDIOLOGY CLINIC | Age: 74
End: 2024-09-04

## 2024-09-04 NOTE — TELEPHONE ENCOUNTER
*States he would like some more samples until it is decided wether or not he is getting watchman*     Medication Samples    Is this a new prescription?  No    Have you received samples of this medication in the past?  When?  Yes    Does your Insurance cover this medication?     Have you applied for patient assistance?     Medication:  apixaban (ELIQUIS)     Dosage of the medication:  5 MG TABS tablet     How are you taking this medication (QD, BID, TID, QID, PRN):  Take 1 tablet by mouth 2 times daily     When was your last appointment with cardiology?    (If 1 yr or longer, please schedule appointment)    (If patient has been told they do not need to follow-up - medications should be filled by PCP)  8/24    When did you last have labs drawn?     When will you run out of your medication?       Callback: [phone]

## 2024-10-07 ENCOUNTER — TELEPHONE (OUTPATIENT)
Dept: CARDIOLOGY CLINIC | Age: 74
End: 2024-10-07

## 2024-10-07 DIAGNOSIS — Z01.818 PRE-OP TESTING: ICD-10-CM

## 2024-10-07 DIAGNOSIS — I48.0 PAROXYSMAL A-FIB (HCC): Primary | ICD-10-CM

## 2024-10-07 DIAGNOSIS — Z95.818 PRESENCE OF WATCHMAN LEFT ATRIAL APPENDAGE CLOSURE DEVICE: ICD-10-CM

## 2024-10-07 RX ORDER — CHLORHEXIDINE GLUCONATE 40 MG/ML
SOLUTION TOPICAL ONCE
Qty: 1 EACH | Refills: 0 | Status: SHIPPED | OUTPATIENT
Start: 2024-10-07 | End: 2024-10-07

## 2024-10-07 NOTE — TELEPHONE ENCOUNTER
Dr. Hudson 7/12/2024 referred for Watchman procedure.  Dr. Hudson consulted on 7/12/2024.  Dr. Marley shared decision on 8/1/2024.  DON to be done the day of the procedure.  Insurance to be approved per Ana Mandel

## 2024-10-07 NOTE — TELEPHONE ENCOUNTER
Spoke with patient which went to his Urologist on 9/7/2024 and was given ok to proceed with Watchman.  Patient has had some episodes of Hematuria and had to  stop Eliquis.  I informed him of the Watchman procedure that is being scheduled for 11/4/2024 7:30 AM arrival.  Lab work has been ordered and instructed to have done at [facility] 10/28/2024.  Also informed to get Hibiclens bath at their Pharmacy.  All procedure instructions were e-mailed to patient.  Instructed to contact me with any questions prior to procedure.

## 2024-10-07 NOTE — TELEPHONE ENCOUNTER
Ron called wanting to know next steps for his Watchman. Patient already had second opinion appt in August, CT calcium test completed as well.    Please advise.    Evans's callback: [phone]

## 2024-10-15 ENCOUNTER — TRANSCRIBE ORDERS (OUTPATIENT)
Dept: ADMINISTRATIVE | Age: 74
End: 2024-10-15

## 2024-10-15 DIAGNOSIS — R31.0 GROSS HEMATURIA: Primary | ICD-10-CM

## 2024-10-15 DIAGNOSIS — R35.1 NOCTURIA: ICD-10-CM

## 2024-10-15 DIAGNOSIS — N43.2 OTHER HYDROCELE: ICD-10-CM

## 2024-10-17 ENCOUNTER — HOSPITAL ENCOUNTER (OUTPATIENT)
Dept: ULTRASOUND IMAGING | Age: 74
Discharge: HOME | End: 2024-10-17
Attending: UROLOGY
Payer: MEDICARE

## 2024-10-17 DIAGNOSIS — R31.0 GROSS HEMATURIA: ICD-10-CM

## 2024-10-17 PROCEDURE — 76870 US EXAM SCROTUM: CPT

## 2024-10-28 ENCOUNTER — TELEPHONE (OUTPATIENT)
Dept: CARDIOLOGY CLINIC | Age: 74
End: 2024-10-28

## 2024-10-28 NOTE — TELEPHONE ENCOUNTER
Lius called in he states he is on amoxicillin for a cold,he states he has been on it for 6 days,he has 4 more days to be on it,he is having a watchman procedure on 11/4 he wants to know if that will be ok.      He can be reached at [phone].

## 2024-10-28 NOTE — TELEPHONE ENCOUNTER
Patient scheduled for Kory 11/4 he in on ABX for URI \"cold\".  Has been on for 6 days and has four more days to go.     Ok to proceed as scheduled with kory or should it be cancelled and rescheduled for a later date?

## 2024-10-29 DIAGNOSIS — Z01.818 PRE-OP TESTING: ICD-10-CM

## 2024-10-29 DIAGNOSIS — I48.0 PAROXYSMAL A-FIB (HCC): ICD-10-CM

## 2024-10-29 LAB
ABO + RH BLD: NORMAL
ANION GAP SERPL CALCULATED.3IONS-SCNC: 7 MMOL/L (ref 3–16)
BACTERIA URNS QL MICRO: ABNORMAL /HPF
BILIRUB UR QL STRIP.AUTO: NEGATIVE
BLD GP AB SCN SERPL QL: NORMAL
BUN SERPL-MCNC: 18 MG/DL (ref 7–20)
CALCIUM SERPL-MCNC: 9.8 MG/DL (ref 8.3–10.6)
CHLORIDE SERPL-SCNC: 105 MMOL/L (ref 99–110)
CLARITY UR: CLEAR
CO2 SERPL-SCNC: 29 MMOL/L (ref 21–32)
COLOR UR: YELLOW
CREAT SERPL-MCNC: 0.8 MG/DL (ref 0.8–1.3)
DEPRECATED RDW RBC AUTO: 13.4 % (ref 12.4–15.4)
EPI CELLS #/AREA URNS AUTO: 0 /HPF (ref 0–5)
GFR SERPLBLD CREATININE-BSD FMLA CKD-EPI: >90 ML/MIN/{1.73_M2}
GLUCOSE SERPL-MCNC: 95 MG/DL (ref 70–99)
GLUCOSE UR STRIP.AUTO-MCNC: NEGATIVE MG/DL
HCT VFR BLD AUTO: 42 % (ref 40.5–52.5)
HGB BLD-MCNC: 14.2 G/DL (ref 13.5–17.5)
HGB UR QL STRIP.AUTO: ABNORMAL
HYALINE CASTS #/AREA URNS AUTO: 0 /LPF (ref 0–8)
INR PPP: 1.18 (ref 0.85–1.15)
KETONES UR STRIP.AUTO-MCNC: NEGATIVE MG/DL
LEUKOCYTE ESTERASE UR QL STRIP.AUTO: ABNORMAL
MCH RBC QN AUTO: 29.9 PG (ref 26–34)
MCHC RBC AUTO-ENTMCNC: 33.8 G/DL (ref 31–36)
MCV RBC AUTO: 88.3 FL (ref 80–100)
NITRITE UR QL STRIP.AUTO: NEGATIVE
PH UR STRIP.AUTO: 5.5 [PH] (ref 5–8)
PLATELET # BLD AUTO: 159 K/UL (ref 135–450)
PMV BLD AUTO: 9.4 FL (ref 5–10.5)
POTASSIUM SERPL-SCNC: 4.3 MMOL/L (ref 3.5–5.1)
PROT UR STRIP.AUTO-MCNC: ABNORMAL MG/DL
PROTHROMBIN TIME: 15.2 SEC (ref 11.9–14.9)
RBC # BLD AUTO: 4.75 M/UL (ref 4.2–5.9)
RBC CLUMPS #/AREA URNS AUTO: 47 /HPF (ref 0–4)
SODIUM SERPL-SCNC: 141 MMOL/L (ref 136–145)
SP GR UR STRIP.AUTO: 1.02 (ref 1–1.03)
UA DIPSTICK W REFLEX MICRO PNL UR: YES
URN SPEC COLLECT METH UR: ABNORMAL
UROBILINOGEN UR STRIP-ACNC: 0.2 E.U./DL
WBC # BLD AUTO: 3.5 K/UL (ref 4–11)
WBC #/AREA URNS AUTO: 1 /HPF (ref 0–5)

## 2024-10-30 NOTE — TELEPHONE ENCOUNTER
Watchman 11/4/2024     7:30 AM Luis Zambrano 1950 [facility]    Spoke with patient and went over all instructions.     Creatinine 0.8  Glucose 95  Hgb 14.2  Plt 159  U/A Negative  PT/INR 15.2 / 1.18    CHADSvasc is at least 3 (Age, HTN)  HASbled is at least 3.    Currently on Eliquis 5 mg BID. He is a poor candidate for chronic anticoagulation with his history of hematuria/GIB.     Dr. Hudson 7/12/2024 referred for Watchman procedure.  Dr. Hudson consulted on 7/12/2024.  Dr. Marley shared decision on 8/1/2024.  DON to be done the day of the procedure.  Insurance to be approved per Ana Mandel     Call patient on Friday to review medications/problems. YES.  UTI (Antibiotic taken)? Negative.  Any groin issues? look for any type of rash, open skin, etc NO.  On any blood thinners & when to stop taking? Take Eliquis on 11/4/2024 then hold medication.  Take a Baby Aspirin the day of the procedure.  Lab work addressed prior to admission. YES.  Allergies addressed? YES.   Pre-medication necessary? NO.  Pt. staying overnight? NO.  PT/INR, T&C & Glucose ordered for day of procedure on every patient. YES.  \"Electrophysiology Testing\" order placed on every patient? YES.

## 2024-11-04 ENCOUNTER — ANESTHESIA EVENT (OUTPATIENT)
Age: 74
DRG: 274 | End: 2024-11-04
Payer: MEDICARE

## 2024-11-04 ENCOUNTER — ANESTHESIA (OUTPATIENT)
Age: 74
DRG: 274 | End: 2024-11-04
Payer: MEDICARE

## 2024-11-04 ENCOUNTER — HOSPITAL ENCOUNTER (OUTPATIENT)
Age: 74
Discharge: HOME | DRG: 274 | End: 2024-11-06
Attending: INTERNAL MEDICINE
Payer: MEDICARE

## 2024-11-04 ENCOUNTER — HOSPITAL ENCOUNTER (INPATIENT)
Age: 74
LOS: 1 days | Discharge: HOME | DRG: 274 | End: 2024-11-04
Attending: INTERNAL MEDICINE | Admitting: INTERNAL MEDICINE
Payer: MEDICARE

## 2024-11-04 VITALS
HEIGHT: 69 IN | OXYGEN SATURATION: 95 % | SYSTOLIC BLOOD PRESSURE: 158 MMHG | RESPIRATION RATE: 16 BRPM | HEART RATE: 72 BPM | DIASTOLIC BLOOD PRESSURE: 88 MMHG | TEMPERATURE: 97.9 F | BODY MASS INDEX: 29.92 KG/M2 | WEIGHT: 202 LBS

## 2024-11-04 DIAGNOSIS — I48.0 PAROXYSMAL ATRIAL FIBRILLATION (HCC): ICD-10-CM

## 2024-11-04 DIAGNOSIS — Z01.818 PRE-OP TESTING: ICD-10-CM

## 2024-11-04 DIAGNOSIS — I48.0 PAROXYSMAL A-FIB (HCC): ICD-10-CM

## 2024-11-04 LAB
ABO + RH BLD: NORMAL
BLD GP AB SCN SERPL QL: NORMAL
ECHO BSA: 2.11 M2
EKG ATRIAL RATE: 72 BPM
EKG DIAGNOSIS: NORMAL
EKG P AXIS: 67 DEGREES
EKG P-R INTERVAL: 214 MS
EKG Q-T INTERVAL: 436 MS
EKG QRS DURATION: 144 MS
EKG QTC CALCULATION (BAZETT): 477 MS
EKG R AXIS: -66 DEGREES
EKG T AXIS: 112 DEGREES
EKG VENTRICULAR RATE: 72 BPM
GLUCOSE BLD-MCNC: 97 MG/DL (ref 70–99)
PERFORMED ON: NORMAL
POC ACT LR: 331 SEC

## 2024-11-04 PROCEDURE — 02L73DK OCCLUSION OF LEFT ATRIAL APPENDAGE WITH INTRALUMINAL DEVICE, PERCUTANEOUS APPROACH: ICD-10-PCS | Performed by: INTERNAL MEDICINE

## 2024-11-04 PROCEDURE — 86900 BLOOD TYPING SEROLOGIC ABO: CPT

## 2024-11-04 PROCEDURE — 7100000001 HC PACU RECOVERY - ADDTL 15 MIN: Performed by: INTERNAL MEDICINE

## 2024-11-04 PROCEDURE — 93312 ECHO TRANSESOPHAGEAL: CPT | Performed by: INTERNAL MEDICINE

## 2024-11-04 PROCEDURE — 6360000004 HC RX CONTRAST MEDICATION: Performed by: INTERNAL MEDICINE

## 2024-11-04 PROCEDURE — 2709999900 HC NON-CHARGEABLE SUPPLY: Performed by: INTERNAL MEDICINE

## 2024-11-04 PROCEDURE — C1894 INTRO/SHEATH, NON-LASER: HCPCS | Performed by: INTERNAL MEDICINE

## 2024-11-04 PROCEDURE — 36415 COLL VENOUS BLD VENIPUNCTURE: CPT

## 2024-11-04 PROCEDURE — 6360000002 HC RX W HCPCS: Performed by: INTERNAL MEDICINE

## 2024-11-04 PROCEDURE — C1769 GUIDE WIRE: HCPCS | Performed by: INTERNAL MEDICINE

## 2024-11-04 PROCEDURE — 7100000011 HC PHASE II RECOVERY - ADDTL 15 MIN: Performed by: INTERNAL MEDICINE

## 2024-11-04 PROCEDURE — 86901 BLOOD TYPING SEROLOGIC RH(D): CPT

## 2024-11-04 PROCEDURE — 6360000002 HC RX W HCPCS: Performed by: NURSE ANESTHETIST, CERTIFIED REGISTERED

## 2024-11-04 PROCEDURE — 3700000000 HC ANESTHESIA ATTENDED CARE: Performed by: INTERNAL MEDICINE

## 2024-11-04 PROCEDURE — 33340 PERQ CLSR TCAT L ATR APNDGE: CPT | Performed by: INTERNAL MEDICINE

## 2024-11-04 PROCEDURE — 7100000000 HC PACU RECOVERY - FIRST 15 MIN: Performed by: INTERNAL MEDICINE

## 2024-11-04 PROCEDURE — 2500000003 HC RX 250 WO HCPCS: Performed by: NURSE ANESTHETIST, CERTIFIED REGISTERED

## 2024-11-04 PROCEDURE — 85347 COAGULATION TIME ACTIVATED: CPT

## 2024-11-04 PROCEDURE — 2500000003 HC RX 250 WO HCPCS: Performed by: INTERNAL MEDICINE

## 2024-11-04 PROCEDURE — 2580000003 HC RX 258: Performed by: INTERNAL MEDICINE

## 2024-11-04 PROCEDURE — 1200000000 HC SEMI PRIVATE

## 2024-11-04 PROCEDURE — 86850 RBC ANTIBODY SCREEN: CPT

## 2024-11-04 PROCEDURE — C1889 IMPLANT/INSERT DEVICE, NOC: HCPCS | Performed by: INTERNAL MEDICINE

## 2024-11-04 PROCEDURE — B24BZZ4 ULTRASONOGRAPHY OF HEART WITH AORTA, TRANSESOPHAGEAL: ICD-10-PCS | Performed by: INTERNAL MEDICINE

## 2024-11-04 PROCEDURE — 93325 DOPPLER ECHO COLOR FLOW MAPG: CPT | Performed by: INTERNAL MEDICINE

## 2024-11-04 PROCEDURE — C1760 CLOSURE DEV, VASC: HCPCS | Performed by: INTERNAL MEDICINE

## 2024-11-04 PROCEDURE — 7100000010 HC PHASE II RECOVERY - FIRST 15 MIN: Performed by: INTERNAL MEDICINE

## 2024-11-04 PROCEDURE — APPNB45 APP NON BILLABLE 31-45 MINUTES: Performed by: NURSE PRACTITIONER

## 2024-11-04 PROCEDURE — 2720000010 HC SURG SUPPLY STERILE: Performed by: INTERNAL MEDICINE

## 2024-11-04 PROCEDURE — 3700000001 HC ADD 15 MINUTES (ANESTHESIA): Performed by: INTERNAL MEDICINE

## 2024-11-04 PROCEDURE — 93355 ECHO TRANSESOPHAGEAL (TEE): CPT

## 2024-11-04 DEVICE — LEFT ATRIAL APPENDAGE CLOSURE DEVICE WITH DELIVERY SYSTEM
Type: IMPLANTABLE DEVICE | Status: FUNCTIONAL
Brand: WATCHMAN FLX™ PRO

## 2024-11-04 RX ORDER — FENTANYL CITRATE 50 UG/ML
25 INJECTION, SOLUTION INTRAMUSCULAR; INTRAVENOUS EVERY 5 MIN PRN
Status: DISCONTINUED | OUTPATIENT
Start: 2024-11-04 | End: 2024-11-05 | Stop reason: HOSPADM

## 2024-11-04 RX ORDER — ONDANSETRON 2 MG/ML
4 INJECTION INTRAMUSCULAR; INTRAVENOUS
Status: DISCONTINUED | OUTPATIENT
Start: 2024-11-04 | End: 2024-11-05 | Stop reason: HOSPADM

## 2024-11-04 RX ORDER — SODIUM CHLORIDE 9 MG/ML
INJECTION, SOLUTION INTRAVENOUS PRN
Status: DISCONTINUED | OUTPATIENT
Start: 2024-11-04 | End: 2024-11-04 | Stop reason: RX

## 2024-11-04 RX ORDER — LIDOCAINE HYDROCHLORIDE 20 MG/ML
INJECTION, SOLUTION EPIDURAL; INFILTRATION; INTRACAUDAL; PERINEURAL
Status: DISCONTINUED | OUTPATIENT
Start: 2024-11-04 | End: 2024-11-04 | Stop reason: SDUPTHER

## 2024-11-04 RX ORDER — PROPOFOL 10 MG/ML
INJECTION, EMULSION INTRAVENOUS
Status: DISCONTINUED | OUTPATIENT
Start: 2024-11-04 | End: 2024-11-04 | Stop reason: SDUPTHER

## 2024-11-04 RX ORDER — ONDANSETRON 2 MG/ML
INJECTION INTRAMUSCULAR; INTRAVENOUS
Status: DISCONTINUED | OUTPATIENT
Start: 2024-11-04 | End: 2024-11-04 | Stop reason: SDUPTHER

## 2024-11-04 RX ORDER — OXYCODONE HYDROCHLORIDE 5 MG/1
5 TABLET ORAL PRN
Status: DISCONTINUED | OUTPATIENT
Start: 2024-11-04 | End: 2024-11-04 | Stop reason: HOSPADM

## 2024-11-04 RX ORDER — ROCURONIUM BROMIDE 10 MG/ML
INJECTION, SOLUTION INTRAVENOUS
Status: DISCONTINUED | OUTPATIENT
Start: 2024-11-04 | End: 2024-11-04 | Stop reason: SDUPTHER

## 2024-11-04 RX ORDER — SODIUM CHLORIDE 0.9 % (FLUSH) 0.9 %
5-40 SYRINGE (ML) INJECTION PRN
Status: DISCONTINUED | OUTPATIENT
Start: 2024-11-04 | End: 2024-11-05 | Stop reason: HOSPADM

## 2024-11-04 RX ORDER — ASPIRIN 81 MG/1
81 TABLET ORAL DAILY
COMMUNITY

## 2024-11-04 RX ORDER — SODIUM CHLORIDE 0.9 % (FLUSH) 0.9 %
5-40 SYRINGE (ML) INJECTION EVERY 12 HOURS SCHEDULED
Status: DISCONTINUED | OUTPATIENT
Start: 2024-11-04 | End: 2024-11-05 | Stop reason: HOSPADM

## 2024-11-04 RX ORDER — NALOXONE HYDROCHLORIDE 0.4 MG/ML
INJECTION, SOLUTION INTRAMUSCULAR; INTRAVENOUS; SUBCUTANEOUS PRN
Status: DISCONTINUED | OUTPATIENT
Start: 2024-11-04 | End: 2024-11-05 | Stop reason: HOSPADM

## 2024-11-04 RX ORDER — IOPAMIDOL 755 MG/ML
INJECTION, SOLUTION INTRAVASCULAR PRN
Status: DISCONTINUED | OUTPATIENT
Start: 2024-11-04 | End: 2024-11-04 | Stop reason: HOSPADM

## 2024-11-04 RX ORDER — OXYCODONE HYDROCHLORIDE 5 MG/1
10 TABLET ORAL PRN
Status: DISCONTINUED | OUTPATIENT
Start: 2024-11-04 | End: 2024-11-04 | Stop reason: HOSPADM

## 2024-11-04 RX ORDER — HEPARIN SODIUM 1000 [USP'U]/ML
INJECTION, SOLUTION INTRAVENOUS; SUBCUTANEOUS
Status: DISCONTINUED | OUTPATIENT
Start: 2024-11-04 | End: 2024-11-04 | Stop reason: SDUPTHER

## 2024-11-04 RX ORDER — SODIUM CHLORIDE 9 MG/ML
INJECTION, SOLUTION INTRAVENOUS PRN
Status: DISCONTINUED | OUTPATIENT
Start: 2024-11-04 | End: 2024-11-05 | Stop reason: HOSPADM

## 2024-11-04 RX ORDER — HYDROMORPHONE HYDROCHLORIDE 2 MG/ML
0.5 INJECTION, SOLUTION INTRAMUSCULAR; INTRAVENOUS; SUBCUTANEOUS EVERY 5 MIN PRN
Status: DISCONTINUED | OUTPATIENT
Start: 2024-11-04 | End: 2024-11-05 | Stop reason: HOSPADM

## 2024-11-04 RX ORDER — FENTANYL CITRATE 50 UG/ML
INJECTION, SOLUTION INTRAMUSCULAR; INTRAVENOUS
Status: DISCONTINUED | OUTPATIENT
Start: 2024-11-04 | End: 2024-11-04 | Stop reason: SDUPTHER

## 2024-11-04 RX ORDER — METOCLOPRAMIDE HYDROCHLORIDE 5 MG/ML
10 INJECTION INTRAMUSCULAR; INTRAVENOUS
Status: DISCONTINUED | OUTPATIENT
Start: 2024-11-04 | End: 2024-11-05 | Stop reason: HOSPADM

## 2024-11-04 RX ORDER — PROTAMINE SULFATE 10 MG/ML
INJECTION, SOLUTION INTRAVENOUS
Status: DISCONTINUED | OUTPATIENT
Start: 2024-11-04 | End: 2024-11-04 | Stop reason: SDUPTHER

## 2024-11-04 RX ORDER — ACETAMINOPHEN 325 MG/1
650 TABLET ORAL EVERY 4 HOURS PRN
Status: DISCONTINUED | OUTPATIENT
Start: 2024-11-04 | End: 2024-11-05 | Stop reason: HOSPADM

## 2024-11-04 RX ORDER — SODIUM CHLORIDE 9 MG/ML
INJECTION, SOLUTION INTRAVENOUS CONTINUOUS
Status: DISCONTINUED | OUTPATIENT
Start: 2024-11-04 | End: 2024-11-04

## 2024-11-04 RX ADMIN — FENTANYL CITRATE 25 MCG: 50 INJECTION, SOLUTION INTRAMUSCULAR; INTRAVENOUS at 09:12

## 2024-11-04 RX ADMIN — PROPOFOL 30 MG: 10 INJECTION, EMULSION INTRAVENOUS at 09:23

## 2024-11-04 RX ADMIN — SODIUM CHLORIDE: 9 INJECTION, SOLUTION INTRAVENOUS at 09:12

## 2024-11-04 RX ADMIN — PROTAMINE SULFATE 25 MG: 10 INJECTION, SOLUTION INTRAVENOUS at 10:02

## 2024-11-04 RX ADMIN — SUGAMMADEX 400 MG: 100 INJECTION, SOLUTION INTRAVENOUS at 10:05

## 2024-11-04 RX ADMIN — ONDANSETRON 4 MG: 2 INJECTION, SOLUTION INTRAMUSCULAR; INTRAVENOUS at 09:26

## 2024-11-04 RX ADMIN — PHENYLEPHRINE HYDROCHLORIDE 50 MCG: 10 INJECTION INTRAVENOUS at 09:40

## 2024-11-04 RX ADMIN — LIDOCAINE HYDROCHLORIDE 100 MG: 20 INJECTION, SOLUTION EPIDURAL; INFILTRATION; INTRACAUDAL; PERINEURAL at 09:22

## 2024-11-04 RX ADMIN — ROCURONIUM BROMIDE 50 MG: 10 INJECTION, SOLUTION INTRAVENOUS at 09:22

## 2024-11-04 RX ADMIN — HEPARIN SODIUM 10000 UNITS: 1000 INJECTION INTRAVENOUS; SUBCUTANEOUS at 09:48

## 2024-11-04 RX ADMIN — FENTANYL CITRATE 25 MCG: 50 INJECTION, SOLUTION INTRAMUSCULAR; INTRAVENOUS at 09:15

## 2024-11-04 RX ADMIN — FENTANYL CITRATE 25 MCG: 50 INJECTION, SOLUTION INTRAMUSCULAR; INTRAVENOUS at 09:49

## 2024-11-04 RX ADMIN — ROCURONIUM BROMIDE 30 MG: 10 INJECTION, SOLUTION INTRAVENOUS at 09:59

## 2024-11-04 RX ADMIN — PROPOFOL 150 MG: 10 INJECTION, EMULSION INTRAVENOUS at 09:22

## 2024-11-04 RX ADMIN — FENTANYL CITRATE 25 MCG: 50 INJECTION, SOLUTION INTRAMUSCULAR; INTRAVENOUS at 09:59

## 2024-11-04 RX ADMIN — Medication 2000 MG: at 09:27

## 2024-11-04 ASSESSMENT — ENCOUNTER SYMPTOMS: SHORTNESS OF BREATH: 1

## 2024-11-04 NOTE — CARE COORDINATION
11/04/24 0753   IMM Letter   IMM Letter given to Patient/Family/Significant other/Guardian/POA/by: Given by CM Admin   IMM Letter date given: 11/04/24   IMM Letter time given: 0749

## 2024-11-04 NOTE — PROGRESS NOTES
R groin site checked after changing into clothes. Small amount of blood noted on top left corner of dressing. Will continue to monitor the site for the next 15 min.

## 2024-11-04 NOTE — PROGRESS NOTES
Patient having scant amount of dressing noted to be outside of [place] outline. After  dressing taken off, mild oozing noted after a few minutes. Will hold manual pressure for 15 min.

## 2024-11-04 NOTE — ANESTHESIA PRE PROCEDURE
Department of Anesthesiology  Preprocedure Note       Name:  Luis Zambrano   Age:  74 y.o.  :  1950                                          MRN:  [id number]         Date:  2024      Surgeon: Surgeon(s):  Winston Hudson MD Verma, Anil, MD    Procedure: Procedure(s):  Watchman paco closure device    Medications prior to admission:   Prior to Admission medications    Medication Sig Start Date End Date Taking? Authorizing Provider   aspirin 81 MG EC tablet Take 1 tablet by mouth daily   Yes ProviderTam MD   apixaban (ELIQUIS) 5 MG TABS tablet Take 1 tablet by mouth 2 times daily 24  Yes Winsotn Hudson MD   BREO ELLIPTA 200-25 MCG/ACT AEPB inhaler 1 puff daily 24  Yes ProviderTam MD   pantoprazole (PROTONIX) 40 MG tablet  24  Yes ProviderTam MD   verapamil (CALAN SR) 180 MG extended release tablet Take 1 tablet by mouth nightly   Yes ProviderTam MD   Multiple Vitamins-Minerals (THERAPEUTIC MULTIVITAMIN-MINERALS) tablet Take 1 tablet by mouth daily   Yes Provider, MD Tam   Omega-3 Fatty Acids (FISH OIL BURP-LESS PO) Take by mouth daily   Yes Provider, Historical, MD   vitamin C (ASCORBIC ACID) 500 MG tablet Take 1 tablet by mouth daily   Yes Provider, MD Tam   lisinopril (PRINIVIL;ZESTRIL) 40 MG tablet TAKE ONE TABLET BY MOUTH DAILY  Patient taking differently: Take 1 tablet by mouth daily 3/14/17  Yes Sadie Goodson MD   PROAIR  (90 BASE) MCG/ACT inhaler INHALE TWO PUFFS BY MOUTH FOUR TIMES A DAY AS NEEDED  Patient taking differently: Inhale 2 puffs into the lungs every 6 hours as needed for Shortness of Breath or Wheezing 17  Yes Sadie Goodson MD   doxazosin (CARDURA) 8 MG tablet Take 1 tablet by mouth nightly 17  Yes Iona Johnston APRN - CNP   fluticasone (FLONASE) 50 MCG/ACT nasal spray PLACE 2 SPRAYS IN EACH NOSTRIL DAILY  Patient taking differently: 1 spray by Nasal route daily as needed 17  Yes  Statement Selected

## 2024-11-04 NOTE — PROGRESS NOTES
Patient up and moving. Patient went to bathroom and back in bed with no bleeding or drainage on R groin site.

## 2024-11-04 NOTE — H&P
[facility]  Cardiology Consult    Luis Zambrano  1950 November 4, 2024        CC: bleeding complications       Subjective:     History of Present Illness:    Luis Zambrano is a 74 y.o. patient with a PMH significant for PAFIB presented with complaints of hematuria.           Past Medical History:   has a past medical history of Atrial fibrillation (HCC), Colon polyp, COPD (chronic obstructive pulmonary disease) (HCC), Enlarged prostate, FHx: hyperlipidemia, GERD (gastroesophageal reflux disease), History of bleeding ulcers, History of echocardiogram, Hypertension, Pacemaker, and Wears glasses.    Surgical History:   has a past surgical history that includes Cardiac catheterization; Colonoscopy; TURP (06/29/2016); Prostate biopsy (2016); Upper gastrointestinal endoscopy (N/A, 07/24/2022); Upper gastrointestinal endoscopy (N/A, 10/3/2022); and Colonoscopy (N/A, 10/3/2022).     Social History:   reports that he has never smoked. He has never used smokeless tobacco. He reports current alcohol use. He reports that he does not use drugs.     Family History:  family history includes Cancer in his father and mother; Diabetes in his father; High Cholesterol in his father and mother.    Home Medications:  Were reviewed and are listed in nursing record and/or below  Prior to Admission medications    Medication Sig Start Date End Date Taking? Authorizing Provider   aspirin 81 MG EC tablet Take 1 tablet by mouth daily   Yes Tam Delgado MD   apixaban (ELIQUIS) 5 MG TABS tablet Take 1 tablet by mouth 2 times daily 9/4/24  Yes Winston Hudson MD   BREO ELLIPTA 200-25 MCG/ACT AEPB inhaler 1 puff daily 7/7/24  Yes Tam Delgado MD   pantoprazole (PROTONIX) 40 MG tablet  1/2/24  Yes Tam Delgado MD   verapamil (CALAN SR) 180 MG extended release tablet Take 1 tablet by mouth nightly   Yes Tam Delgado MD   Multiple Vitamins-Minerals (THERAPEUTIC MULTIVITAMIN-MINERALS) tablet Take

## 2024-11-04 NOTE — DISCHARGE INSTRUCTIONS
Watchman Discharge Instruction    Care of your puncture site:  Remove bandage 24 hours after the procedure.  May shower in 24 hours but do not sit in a bathtub/pool of water for 5 days or until the wound is healed.  Gently clean groin using soap and water.  Dry thoroughly and apply a Band-Aid that covers the entire site. Use Band-Aid until skin heals over in about 3-5 days.   Do not apply powder or lotion.    Limit walking and stair climbing today.    Normal Observations:  Soreness or tenderness which may last one week.  Mild oozing from the incision site.  Possible bruising that could last 2 weeks.    Activity:  You may resume normal activity in 5 days or after the wound heals.  Avoid lifting more than 10 pounds for 5 days or until the wound heals.  Avoid strenuous exercise or activity for 1 week.  You may return to work in 1 week  without restrictions       Call your doctor immediately if your condition worsens, for any other concerns, for a follow-up appointment or if you experience any of the following:  Increased swelling on the groin or leg.  Unusual pain, numbness, or tingling of the groin or down the leg.  Any signs of infection such as: redness, yellow drainage at the site, swelling or pain.    IF GROIN STARTS BLEEDING SIGNIFICANTLY:   LAY FLAT, HOLD FIRM DIRECT PRESSURE, AND CALL 911     antibiotic prophylaxis (amoxicillin 2 g once 60 minutes prior to procedure) for 6 months for:  a.     Dental procedures including cleanings  b.     Gastrointestinal procedures  c.     Genitourinary procedures  d.     Respiratory procedures involving incision or biopsy  e.     Procedures on infected skin or muscle.    SEDATION DISCHARGE INSTRUCTIONS    11/4/2024     Wear your seatbelt home.  You are under the influence of drugs. Do not drink alcohol, drive, operate machinery, or make any important decisions or sign any legal documents for 24 hours  A responsible adult needs to be with you for 24 hours.  You may experience

## 2024-11-04 NOTE — PROGRESS NOTES
Pt to pacu from cath lab post watchman procedure.  VSS.  Right femoral puncture site soft, without hematoma or oozing.  RLE warm, cap refill <3 sec, pedal pulse palpable, strong.

## 2024-11-04 NOTE — PROGRESS NOTES
Discharge instructions reviewed and understanding verbalized per pt/family with copy given. All home medications/new prescriptions have been reviewed, questions answered and patient/family state understanding. Medication information sheet provided for new prescriptions received when applicable. Patient's peripheral IV has been removed without complications. Patient has left unit with all belongings. Patient has left with wife and daughter who are providing transportation.

## 2024-11-04 NOTE — PROGRESS NOTES
After 30 seconds of relieving pressure, a very small amount of oozing still noted. D-stat applied. Will continue to frequently monitor.

## 2024-11-04 NOTE — PROGRESS NOTES
Patient to pre-opa area 14 from PACU. VSS. Patient on room air. Family @ bedside. R pedal pulse palpable. R groin site soft without hematoma or any drainage.

## 2024-11-04 NOTE — DISCHARGE SUMMARY
WATCHMAN implant.      Signed:  Marifer Hernandez, WILL - CNP, 11/4/2024, 11:52 AM    NOTE:  This report was transcribed using voice recognition software.  Every effort was made to ensure accuracy; however, inadvertent computerized transcription errors may be present.

## 2024-11-04 NOTE — PROGRESS NOTES
Outline drainage from d-stat has grown a small amount. Will have patient lay back in bed for 30 min to ensure no new outline drainage has occurred.

## 2024-11-04 NOTE — ANESTHESIA POSTPROCEDURE EVALUATION
Department of Anesthesiology  Postprocedure Note    Patient: Luis Zambrano  MRN: [id number]  YOB: 1950  Date of evaluation: 11/4/2024    Procedure Summary       Date: 11/04/24 Room / Location: [facility] EP LAB 3 / [facility] CARDIAC CATH LAB    Anesthesia Start: 0912 Anesthesia Stop: 1020    Procedure: Watchman paco closure device Diagnosis:       Paroxysmal atrial fibrillation (HCC)      (Paroxysmal atrial fibrillation (HCC) [I48.0])    Providers: Tigre Enriquez MD Responsible Provider: David Malhotra DO    Anesthesia Type: general ASA Status: 4            Anesthesia Type: No value filed.    Al Phase I: Al Score: 8    Al Phase II:      Anesthesia Post Evaluation    Patient location during evaluation: PACU  Patient participation: complete - patient participated  Level of consciousness: awake and alert  Airway patency: patent  Nausea & Vomiting: no nausea  Cardiovascular status: hemodynamically stable  Respiratory status: acceptable  Hydration status: stable  Pain management: adequate    There were no known notable events for this encounter.

## 2024-11-04 NOTE — PROGRESS NOTES
Phase 1 discharge criteria met.  VSS. Right femoral puncture site soft and without hematoma or oozing.  RLE neurovascular checks complete and WDL.  Pt denies pain.  Pt will transfer to phase II care in stable condition.

## 2024-11-05 ENCOUNTER — TELEPHONE (OUTPATIENT)
Dept: CARDIOLOGY CLINIC | Age: 74
End: 2024-11-05

## 2024-11-05 LAB — ECHO BSA: 2.11 M2

## 2024-11-05 NOTE — TELEPHONE ENCOUNTER
Spoke with patient today, he denies any complaints of bleeding or SOB as well no symptoms of Pericardial effusion and no issues with access site either.  I instructed patient to call me if he has any issue or to go to ER if he has any bleeding or other symptoms we went over. Also instructed to stay on Eliquis/BASA unless otherwise and if he starts having issue with blood thinners to call and let us know. Reminded patient to go to scheduled follow up appointments and the Post Watchman DON, he will get a call to schedule this appointment.

## 2024-11-06 NOTE — TELEPHONE ENCOUNTER
Last OV: 8/1/24 Marley  Watchman procedure 11/4/24:  Treatment with antiplatelet and anti thrombotic therapy   2.   DON at 45 days          -  Anticoagulation recommendations:   Anticoagulation with for 6 weeks with Eliquis and aspirin.   DON after 45 days.   If Watchman device is well seated with adequate seal and residual leak < 5 mm, then: start ASA 81 mg + Plavix for total of 6 months post implantation date.                After 6 months, discontinue Plavix and change to ASA indefinitely    Ok for samples?  Please advise. Thanks

## 2024-11-06 NOTE — TELEPHONE ENCOUNTER
I called and spoke with Evans Zambrano and he said the Eliquis is very expensive and asking for samples  as will need to be on it till Jan 15th  Can we please get Evans 2 weeks of louis samples ready and call him when samples are ready   Will forward this to Evans Dave for further evaulation of the Eliquis     Called pt and he is aware and verbalized understanding

## 2024-11-06 NOTE — TELEPHONE ENCOUNTER
11/4/2024 s/p SUCCESSFUL WATCHMAN LAAC PROCEDURE PER DR. Tiger Enriquez of the left atrial appendage occlusion device with no complication, WATCHMAN device used 31 mm size.     --6-month f/u Post Watchman.   NCDR window: 4/3/2025 to 7/2/2025  --1-year f/u Post Watchman.  NCDR window: 9/5/2025 to 1/3/2026  --2-year f/u Post Watchman.  NCDR window: 9/5/2026 to 1/3/2027    Anastasia, Schedule at [facility]  Post procedure DON to be completed 45-59 days post procedure (12/19/2024-1/2/2025) Order placed.     Thanks.   Evans Perez RN, BSN   Watchman Coordinator

## 2024-11-14 NOTE — TELEPHONE ENCOUNTER
Post Watchman DON & Appointments:    Spoke with patient. Reviewed preps with full understanding. The morning of your Procedure-DON you will park in the [facility] parking lot and report directly to the Cath lab to check in.      DATE:  12-27-24  ARRIVAL TIME:  6:30 am  PROCEDURE TIME: 7:30 am  CARDIOLOGIST: Dr. Winston Hudson     Pre-Procedure Instructions:  Do not eat or drink anything 8 hours before your procedure.   Hold all diabetic medications the morning of the procedure including, Metformin.    If you take Lantus/Levemir only take ½ your normal dose the evening before.  All other medications can be taken in the morning with sips of water.   Do not hold anticoagulation therapy: warfarin, Eliquis, Xarelto therapy.   Do not use any lotions, creams or perfume the morning of procedure.   Please arrive 1 hour prior to procedure time.  Please have a responsible adult to drive you home after procedure. It is recommended you do not drive for 24 hours after procedure.    Cath lab will provide you with all post procedure instructions.      If you have any questions regarding the procedure itself or medications, please call [phone] and ask to speak with a nurse. If you need to reschedule the procedure, please call [phone] and ask for Samantha.    Post appts:    6 mo - 5-7-25/2:30 w/JACKSON @ [facility]  1 yr - 11-5-25/2:30 w/SAHILW @ [facility]  Recall set for 2 yr f/u

## 2024-12-12 ENCOUNTER — TELEPHONE (OUTPATIENT)
Dept: CARDIOLOGY CLINIC | Age: 74
End: 2024-12-12

## 2024-12-12 NOTE — TELEPHONE ENCOUNTER
Called and spoke to patient and he has 1 week left of medication. He is scheduled for DON on 12/27. I let him know that I will get him another week and if this is not stopped on the day of the DON this can be discussed.

## 2024-12-12 NOTE — TELEPHONE ENCOUNTER
Medication Samples    Medication:  apixaban (ELIQUIS)     Dosage of the medication:  apixaban (ELIQUIS)     How are you taking this medication (QD, BID, TID, QID, PRN):  Take 1 tablet by mouth 2 times daily     He can be reached at [phone].

## 2024-12-26 NOTE — PROGRESS NOTES
Cardiac Electrophysiology Consultation   Date: 1/15/2025   Reason for Consultation: Atrial Fibrillation/ Transfer of Care   Consult Requesting Physician: Rachid Delaney (Inactive)     Chief Complaint: Evaluation for LAAC with a Watchman      HPI: Luis Zambrano is a 74 y.o. patient with a history of atrial fibrillation, COPD, GERD, hypertension, high grade AVB, LBBB, syncope, GIB,and obesity.     7/31/18 Presented to [facility] following syncopal episodes. Noted to have LBBB back to 2013. Underwent dual chamber PPM implant 8/1/18.     Atrial Fibrillation noted 10/09/2020. Declined starting OAC due to previous history of GI bleed.     07/22/22 Hospitalized at [facility] for upper GIB. Underwent EGD with biopsy that noted 2 small clean based gastric ulcers measuring 6-8 mm in size, located in the gastric antrum, just proximal to the pyloric channel with no high risk stigmata.Mild gastric erythema in the gastric body, greater curvature. Non obstructive Schatzki ring at the GE junction. A 1 cm hiatal hernia and mild duodenitis in the duodenal bulb.     12/29/23 Presented to ED with complaints of headache and double vision. CT head unremarkable for CVA. Device interrogation noted several episodes of atrial fibrillation and atrial flutter. AC discussed and patient started on Eliquis 5 mg BID.     S/p EPS with RFA of AF and PVI. Ablation of atrial flutter 1/29/24 7/2/24 Contacted the office with concerns that he was experiencing blood in urine. Was referred to urology. He stopped anticoagulation.    Seen in clinic for follow up to discuss Watchman 07/12/2024 and shared decision completed 08/01/024 with Dr. Marley.     Contacted the office 10/07/24 and reported episodes of hematuria leading to him stopping Eliquis. Reports urologist provided ok to proceed with Watchman.     Underwent Watchman procedure 11/04/2024. DON 12/27/2024 noted device well seated with no leak of thrombus. Eliquis stopped. Started on Plavix.      Interval

## 2024-12-27 ENCOUNTER — HOSPITAL ENCOUNTER (OUTPATIENT)
Age: 74
Discharge: HOME | End: 2024-12-29
Attending: INTERNAL MEDICINE
Payer: MEDICARE

## 2024-12-27 VITALS
WEIGHT: 211 LBS | OXYGEN SATURATION: 92 % | DIASTOLIC BLOOD PRESSURE: 75 MMHG | HEART RATE: 62 BPM | BODY MASS INDEX: 31.25 KG/M2 | HEIGHT: 69 IN | RESPIRATION RATE: 10 BRPM | SYSTOLIC BLOOD PRESSURE: 118 MMHG

## 2024-12-27 DIAGNOSIS — Z95.818 PRESENCE OF WATCHMAN LEFT ATRIAL APPENDAGE CLOSURE DEVICE: ICD-10-CM

## 2024-12-27 DIAGNOSIS — I48.0 PAROXYSMAL A-FIB (HCC): ICD-10-CM

## 2024-12-27 LAB — ECHO BSA: 2.16 M2

## 2024-12-27 PROCEDURE — 99152 MOD SED SAME PHYS/QHP 5/>YRS: CPT | Performed by: INTERNAL MEDICINE

## 2024-12-27 PROCEDURE — 6370000000 HC RX 637 (ALT 250 FOR IP): Performed by: INTERNAL MEDICINE

## 2024-12-27 PROCEDURE — 93320 DOPPLER ECHO COMPLETE: CPT | Performed by: INTERNAL MEDICINE

## 2024-12-27 PROCEDURE — 93319 3D ECHO IMG CGEN CAR ANOMAL: CPT | Performed by: INTERNAL MEDICINE

## 2024-12-27 PROCEDURE — 93312 ECHO TRANSESOPHAGEAL: CPT | Performed by: INTERNAL MEDICINE

## 2024-12-27 PROCEDURE — 7100000011 HC PHASE II RECOVERY - ADDTL 15 MIN: Performed by: INTERNAL MEDICINE

## 2024-12-27 PROCEDURE — 6360000002 HC RX W HCPCS: Performed by: INTERNAL MEDICINE

## 2024-12-27 PROCEDURE — 93325 DOPPLER ECHO COLOR FLOW MAPG: CPT

## 2024-12-27 PROCEDURE — 7100000010 HC PHASE II RECOVERY - FIRST 15 MIN: Performed by: INTERNAL MEDICINE

## 2024-12-27 RX ORDER — SODIUM CHLORIDE 0.9 % (FLUSH) 0.9 %
5-40 SYRINGE (ML) INJECTION EVERY 12 HOURS SCHEDULED
Status: DISCONTINUED | OUTPATIENT
Start: 2024-12-27 | End: 2024-12-30 | Stop reason: HOSPADM

## 2024-12-27 RX ORDER — LIDOCAINE HYDROCHLORIDE 20 MG/ML
SOLUTION OROPHARYNGEAL PRN
Status: COMPLETED | OUTPATIENT
Start: 2024-12-27 | End: 2024-12-27

## 2024-12-27 RX ORDER — MIDAZOLAM HYDROCHLORIDE 1 MG/ML
INJECTION, SOLUTION INTRAMUSCULAR; INTRAVENOUS PRN
Status: COMPLETED | OUTPATIENT
Start: 2024-12-27 | End: 2024-12-27

## 2024-12-27 RX ORDER — FENTANYL CITRATE 50 UG/ML
INJECTION, SOLUTION INTRAMUSCULAR; INTRAVENOUS PRN
Status: COMPLETED | OUTPATIENT
Start: 2024-12-27 | End: 2024-12-27

## 2024-12-27 RX ORDER — SODIUM CHLORIDE 0.9 % (FLUSH) 0.9 %
5-40 SYRINGE (ML) INJECTION PRN
Status: DISCONTINUED | OUTPATIENT
Start: 2024-12-27 | End: 2024-12-30 | Stop reason: HOSPADM

## 2024-12-27 RX ORDER — CLOPIDOGREL BISULFATE 75 MG/1
75 TABLET ORAL DAILY
Qty: 90 TABLET | Refills: 0 | Status: SHIPPED | OUTPATIENT
Start: 2024-12-27

## 2024-12-27 RX ORDER — SODIUM CHLORIDE 9 MG/ML
INJECTION, SOLUTION INTRAVENOUS PRN
Status: DISCONTINUED | OUTPATIENT
Start: 2024-12-27 | End: 2024-12-30 | Stop reason: HOSPADM

## 2024-12-27 RX ADMIN — MIDAZOLAM 1 MG: 1 INJECTION INTRAMUSCULAR; INTRAVENOUS at 07:41

## 2024-12-27 RX ADMIN — FENTANYL CITRATE 50 MCG: 50 INJECTION INTRAMUSCULAR; INTRAVENOUS at 07:32

## 2024-12-27 RX ADMIN — FENTANYL CITRATE 50 MCG: 50 INJECTION INTRAMUSCULAR; INTRAVENOUS at 07:41

## 2024-12-27 RX ADMIN — MIDAZOLAM 1 MG: 1 INJECTION INTRAMUSCULAR; INTRAVENOUS at 07:32

## 2024-12-27 RX ADMIN — LIDOCAINE HYDROCHLORIDE 10 ML: 20 SOLUTION ORAL; TOPICAL at 07:31

## 2024-12-27 NOTE — H&P
Cardiac Electrophysiology Consultation   Date: 12/27/2024   Reason for Consultation: Atrial Fibrillation/ Transfer of Care   Consult Requesting Physician: Rachid Delaney (Inactive)     Chief Complaint: here for 45 day post Watchman DON    HPI: Luis Zambrano is a 74 y.o. patient with a history of atrial fibrillation, COPD, GERD, hypertension, high grade AVB, LBBB, syncope, GIB,and obesity.     7/31/18 Presented to [facility] following syncopal episodes. Noted to have LBBB back to 2013. Underwent dual chamber PPM implant 8/1/18.     Atrial Fibrillation noted 10/09/2020. Declined starting OAC due to previous history of GI bleed.     07/22/22 Hospitalized at [facility] for upper GIB. Underwent EGD with biopsy that noted 2 small clean based gastric ulcers measuring 6-8 mm in size, located in the gastric antrum, just proximal to the pyloric channel with no high risk stigmata.Mild gastric erythema in the gastric body, greater curvature. Non obstructive Schatzki ring at the GE junction. A 1 cm hiatal hernia and mild duodenitis in the duodenal bulb.     12/29/23 Presented to ED with complaints of headache and double vision. CT head unremarkable for CVA. Device interrogation noted several episodes of atrial fibrillation and atrial flutter. AC discussed and patient started on Eliquis 5 mg BID.     S/p EPS with RFA of AF and PVI. Ablation of atrial flutter 1/29/24 7/2/24 Contacted the office with concerns that he was experiencing blood in urine. Was referred to urology. He stopped anticoagulation.    Interval History:   He underwent LAAC with a Watchman device on 11/4/24. He is here for post Watchman ODN under moderate sedation.     ASA: III  Mallampati: III    Past Medical History:   Diagnosis Date    Atrial fibrillation (HCC)     Colon polyp 09/03/2008    COPD (chronic obstructive pulmonary disease) (HCC)     Enlarged prostate     FHx: hyperlipidemia 04/09/1997    GERD (gastroesophageal reflux disease)     History of bleeding  denies recurrence    Follow up 4.5 months     Winston Hudson MD  Cardiac Electrophysiology  [facility]

## 2024-12-27 NOTE — PROGRESS NOTES
0753 - DON completed, Dr. Hudson speaking with wife at bedside. Awakens easily to name and returns to sleep.    0830 - discharge instructions reviewed with wife Susanna    0845 - Awake and alert, IV discontinued    0850 - to car via wheelchair, discharged to home

## 2025-01-15 ENCOUNTER — NURSE ONLY (OUTPATIENT)
Dept: CARDIOLOGY CLINIC | Age: 75
End: 2025-01-15

## 2025-01-15 ENCOUNTER — OFFICE VISIT (OUTPATIENT)
Dept: CARDIOLOGY CLINIC | Age: 75
End: 2025-01-15

## 2025-01-15 VITALS
OXYGEN SATURATION: 95 % | HEART RATE: 70 BPM | SYSTOLIC BLOOD PRESSURE: 130 MMHG | WEIGHT: 209 LBS | HEIGHT: 69 IN | DIASTOLIC BLOOD PRESSURE: 68 MMHG | BODY MASS INDEX: 30.96 KG/M2

## 2025-01-15 DIAGNOSIS — I44.30 INFRA-HIS ATRIOVENTRICULAR BLOCK: ICD-10-CM

## 2025-01-15 DIAGNOSIS — I44.7 LBBB (LEFT BUNDLE BRANCH BLOCK): ICD-10-CM

## 2025-01-15 DIAGNOSIS — I42.9 CARDIOMYOPATHY, UNSPECIFIED TYPE (HCC): ICD-10-CM

## 2025-01-15 DIAGNOSIS — I44.2 CHB (COMPLETE HEART BLOCK) (HCC): ICD-10-CM

## 2025-01-15 DIAGNOSIS — I48.0 PAROXYSMAL A-FIB (HCC): ICD-10-CM

## 2025-01-15 DIAGNOSIS — Z95.0 PACEMAKER: Primary | ICD-10-CM

## 2025-01-15 DIAGNOSIS — I48.0 PAROXYSMAL A-FIB (HCC): Primary | ICD-10-CM

## 2025-03-24 NOTE — TELEPHONE ENCOUNTER
Requested Prescriptions     Pending Prescriptions Disp Refills    clopidogrel (PLAVIX) 75 MG tablet [Pharmacy Med Name: CLOPIDOGREL 75 MG TABLET] 90 tablet 0     Sig: TAKE 1 TABLET BY MOUTH DAILY     Last OV: 1/15/2025  Next OV: 5/7/2025  Last refill:12/27/2024

## 2025-03-25 RX ORDER — CLOPIDOGREL BISULFATE 75 MG/1
75 TABLET ORAL DAILY
Qty: 90 TABLET | Refills: 0 | Status: SHIPPED | OUTPATIENT
Start: 2025-03-25

## 2025-03-25 NOTE — TELEPHONE ENCOUNTER
Medication Refill    When was your last appointment with cardiology?    (If 1 yr or longer, please schedule appointment)    (If patient has been told they do not need to follow-up - medications should be filled by PCP)  01/15/25    When did you last have labs drawn?     Medication needing refilled?  clopidogrel (PLAVIX)     Dosage of the medication?  75 MG tablet     How are you taking this medication (QD, BID, TID, QID, PRN)?  Take 1 tablet by mouth daily     Do you want a 30 or 90 day supply?  90    When will you run out of your medication?     Which Pharmacy are we sending this medication to?  [id number] [address]   Pharmacy Phone: [phone]   Pharmacy Fax: [phone]

## 2025-03-26 RX ORDER — CLOPIDOGREL BISULFATE 75 MG/1
75 TABLET ORAL DAILY
Qty: 90 TABLET | Refills: 3 | OUTPATIENT
Start: 2025-03-26

## 2025-04-28 NOTE — PROGRESS NOTES
Cardiac Electrophysiology Consultation   Date: 5/7/2025   Reason for Consultation: Atrial Fibrillation/ Transfer of Care   Consult Requesting Physician: Rachid Delaney (Inactive)     Chief Complaint:   Chief Complaint   Patient presents with    Follow-up     No cc      HPI: Luis Zambrano is a 75 y.o. patient with a history of atrial fibrillation, COPD, GERD, hypertension, high grade AVB, LBBB, syncope, GIB,and obesity.     7/31/18 Presented to [facility] following syncopal episodes. Noted to have LBBB back to 2013. Underwent dual chamber PPM implant 8/1/18.     Atrial Fibrillation noted 10/09/2020. Declined starting OAC due to previous history of GI bleed.     07/22/22 Hospitalized at [facility] for upper GIB. Underwent EGD with biopsy that noted 2 small clean based gastric ulcers measuring 6-8 mm in size, located in the gastric antrum, just proximal to the pyloric channel with no high risk stigmata.Mild gastric erythema in the gastric body, greater curvature. Non obstructive Schatzki ring at the GE junction. A 1 cm hiatal hernia and mild duodenitis in the duodenal bulb.     12/29/23 Presented to ED with complaints of headache and double vision. CT head unremarkable for CVA. Device interrogation noted several episodes of atrial fibrillation and atrial flutter. AC discussed and patient started on Eliquis 5 mg BID.     S/p EPS with RFA of AF and PVI. Ablation of atrial flutter 1/29/24 7/2/24 Contacted the office with concerns that he was experiencing blood in urine. Was referred to urology. He stopped anticoagulation.    Seen in clinic for follow up to discuss Watchman 07/12/2024 and shared decision completed 08/01/024 with Dr. Marley.     Contacted the office 10/07/24 and reported episodes of hematuria leading to him stopping Eliquis. Reports urologist provided ok to proceed with Watchman.     Underwent Watchman procedure 11/04/2024. DON 12/27/2024 noted device well seated with no leak of thrombus. Eliquis stopped. Started

## 2025-05-07 ENCOUNTER — OFFICE VISIT (OUTPATIENT)
Dept: CARDIOLOGY CLINIC | Age: 75
End: 2025-05-07
Payer: MEDICARE

## 2025-05-07 ENCOUNTER — CLINICAL SUPPORT (OUTPATIENT)
Dept: CARDIOLOGY CLINIC | Age: 75
End: 2025-05-07

## 2025-05-07 VITALS
HEART RATE: 68 BPM | WEIGHT: 217.4 LBS | HEIGHT: 69 IN | SYSTOLIC BLOOD PRESSURE: 126 MMHG | DIASTOLIC BLOOD PRESSURE: 64 MMHG | BODY MASS INDEX: 32.2 KG/M2 | OXYGEN SATURATION: 98 %

## 2025-05-07 DIAGNOSIS — I44.7 LBBB (LEFT BUNDLE BRANCH BLOCK): ICD-10-CM

## 2025-05-07 DIAGNOSIS — I48.0 PAROXYSMAL A-FIB (HCC): ICD-10-CM

## 2025-05-07 DIAGNOSIS — Z95.818 PRESENCE OF WATCHMAN LEFT ATRIAL APPENDAGE CLOSURE DEVICE: ICD-10-CM

## 2025-05-07 DIAGNOSIS — Z95.0 PACEMAKER: ICD-10-CM

## 2025-05-07 DIAGNOSIS — Z95.0 PACEMAKER: Primary | ICD-10-CM

## 2025-05-07 DIAGNOSIS — I44.2 CHB (COMPLETE HEART BLOCK) (HCC): ICD-10-CM

## 2025-05-07 DIAGNOSIS — I48.0 PAROXYSMAL ATRIAL FIBRILLATION (HCC): ICD-10-CM

## 2025-05-07 DIAGNOSIS — I48.0 PAROXYSMAL A-FIB (HCC): Primary | ICD-10-CM

## 2025-05-07 PROCEDURE — 93000 ELECTROCARDIOGRAM COMPLETE: CPT | Performed by: INTERNAL MEDICINE

## 2025-05-07 PROCEDURE — 3078F DIAST BP <80 MM HG: CPT | Performed by: INTERNAL MEDICINE

## 2025-05-07 PROCEDURE — 99215 OFFICE O/P EST HI 40 MIN: CPT | Performed by: INTERNAL MEDICINE

## 2025-05-07 PROCEDURE — 1159F MED LIST DOCD IN RCRD: CPT | Performed by: INTERNAL MEDICINE

## 2025-05-07 PROCEDURE — 1123F ACP DISCUSS/DSCN MKR DOCD: CPT | Performed by: INTERNAL MEDICINE

## 2025-05-07 PROCEDURE — 3074F SYST BP LT 130 MM HG: CPT | Performed by: INTERNAL MEDICINE

## 2025-05-13 ENCOUNTER — HOSPITAL ENCOUNTER (OUTPATIENT)
Dept: CT IMAGING | Age: 75
Discharge: HOME | End: 2025-05-13
Attending: INTERNAL MEDICINE
Payer: MEDICARE

## 2025-05-13 ENCOUNTER — RESULTS FOLLOW-UP (OUTPATIENT)
Dept: CARDIOLOGY CLINIC | Age: 75
End: 2025-05-13

## 2025-05-13 DIAGNOSIS — I48.0 PAROXYSMAL A-FIB (HCC): ICD-10-CM

## 2025-05-13 DIAGNOSIS — Z95.818 PRESENCE OF WATCHMAN LEFT ATRIAL APPENDAGE CLOSURE DEVICE: ICD-10-CM

## 2025-05-13 PROCEDURE — 6360000004 HC RX CONTRAST MEDICATION: Performed by: INTERNAL MEDICINE

## 2025-05-13 PROCEDURE — 75572 CT HRT W/3D IMAGE: CPT | Performed by: INTERNAL MEDICINE

## 2025-05-13 PROCEDURE — 75572 CT HRT W/3D IMAGE: CPT

## 2025-05-13 RX ORDER — IOPAMIDOL 755 MG/ML
75 INJECTION, SOLUTION INTRAVASCULAR
Status: COMPLETED | OUTPATIENT
Start: 2025-05-13 | End: 2025-05-13

## 2025-05-13 RX ADMIN — IOPAMIDOL 75 ML: 755 INJECTION, SOLUTION INTRAVENOUS at 08:24

## 2025-05-19 ENCOUNTER — TELEPHONE (OUTPATIENT)
Dept: CARDIOLOGY CLINIC | Age: 75
End: 2025-05-19

## 2025-05-19 NOTE — TELEPHONE ENCOUNTER
Luis called asking if he should come off his blood thinner now that he had his procedure done.    Call back number [phone]

## (undated) DEVICE — ANGIOGRAPHY KIT LT HRT 3 V

## (undated) DEVICE — FORCEPS BX 240CM 2.4MM L NDL RAD JAW 4 M00513334

## (undated) DEVICE — ELECTRODE PT RET AD L9FT HI MOIST COND ADH HYDRGEL CORDED

## (undated) DEVICE — PINNACLE INTRODUCER SHEATH: Brand: PINNACLE

## (undated) DEVICE — ENDOSCOPY KIT: Brand: MEDLINE INDUSTRIES, INC.

## (undated) DEVICE — BITE BLOCK ENDOSCP AD 60 FR W/ ADJ STRP PLAS GRN BLOX

## (undated) DEVICE — MASIMOSET LNCS ADTX SPO2 ADULT PULSE OXIMETER ADHESIVE SENSOR: Brand: MASIMOSET® LNCS® ADTX SPO2 ADULT PULSE OXIMETER ADHESIVE SENSOR

## (undated) DEVICE — SNARES COLD OVAL 10MM THIN

## (undated) DEVICE — PROBE COVER KIT: Brand: MEDLINE INDUSTRIES, INC.

## (undated) DEVICE — PACEMAKER PACK: Brand: MEDLINE INDUSTRIES, INC.

## (undated) DEVICE — CONTAINER SPEC 480ML CLR POLYSTYR 10% NEUT BUFF FRMLN ZN

## (undated) DEVICE — PERCLOSE™ PROSTYLE™ SUTURE-MEDIATED CLOSURE AND REPAIR SYSTEM: Brand: PERCLOSE™ PROSTYLE™

## (undated) DEVICE — PERCUTANEOUS ENTRY THINWALL NEEDLE  ONE-PART: Brand: COOK

## (undated) DEVICE — PAD, DEFIB, ADULT, RADIOTRANS, PHYSIO: Brand: MEDLINE

## (undated) DEVICE — ACCESS SHEATH WITH DILATOR: Brand: WATCHMAN TRUSTEER™ ACCESS SYSTEM

## (undated) DEVICE — 6FR ANGLED PIGTAIL CATHETER 110CM

## (undated) DEVICE — LIGHT HANDLE COVER: Brand: UNBRANDED

## (undated) DEVICE — TRAP POLYP ETRAP

## (undated) DEVICE — STRIP,CLOSURE,WOUND,MEDI-STRIP,1/2X4: Brand: MEDLINE

## (undated) DEVICE — Device: Brand: NOMOLINE™ LH ADULT NASAL CO2 CANNULA WITH O2 4M

## (undated) DEVICE — 1 X VERSACROSS CONNECT TRANSSEPTAL DILATOR (INCLUDING 1 X J-TIP MECHANICAL GUIDEWIRE); 1 X VERSACROSS RF WIRE (INCLUDING 1 X CONNECTOR CABLE (SINGLE USE)); 1 X DISPERSIVE ELECTRODE: Brand: VERSACROSS CONNECT LAAC ACCESS SOLUTION

## (undated) DEVICE — RESTRAINT EXT ANK WRST LT BLU FOAM 2 STRP SIDE BCKL HK AND